# Patient Record
Sex: FEMALE | Race: WHITE | NOT HISPANIC OR LATINO | Employment: FULL TIME | URBAN - METROPOLITAN AREA
[De-identification: names, ages, dates, MRNs, and addresses within clinical notes are randomized per-mention and may not be internally consistent; named-entity substitution may affect disease eponyms.]

---

## 2017-01-25 ENCOUNTER — HOSPITAL ENCOUNTER (OUTPATIENT)
Dept: RADIOLOGY | Facility: HOSPITAL | Age: 47
Discharge: HOME | End: 2017-01-25
Payer: COMMERCIAL

## 2017-01-25 ENCOUNTER — TRANSCRIBE ORDERS (OUTPATIENT)
Dept: ADMINISTRATIVE | Facility: HOSPITAL | Age: 47
End: 2017-01-25

## 2017-01-25 DIAGNOSIS — M54.5 LOW BACK PAIN, UNSPECIFIED BACK PAIN LATERALITY, UNSPECIFIED CHRONICITY, WITH SCIATICA PRESENCE UNSPECIFIED: Primary | ICD-10-CM

## 2017-01-25 DIAGNOSIS — M54.5 LOW BACK PAIN, UNSPECIFIED BACK PAIN LATERALITY, UNSPECIFIED CHRONICITY, WITH SCIATICA PRESENCE UNSPECIFIED: ICD-10-CM

## 2017-01-25 PROCEDURE — 72200 X-RAY EXAM SI JOINTS: CPT

## 2017-03-03 ENCOUNTER — HOSPITAL ENCOUNTER (OUTPATIENT)
Dept: MAMMOGRAPHY | Facility: HOSPITAL | Age: 47
Discharge: HOME | End: 2017-03-03
Payer: COMMERCIAL

## 2017-03-03 DIAGNOSIS — Z12.31 ENCOUNTER FOR SCREENING MAMMOGRAM FOR MALIGNANT NEOPLASM OF BREAST: ICD-10-CM

## 2017-03-03 PROCEDURE — G0202 SCR MAMMO BI INCL CAD: HCPCS

## 2017-04-20 ENCOUNTER — HOSPITAL ENCOUNTER (OUTPATIENT)
Dept: RADIOLOGY | Facility: CLINIC | Age: 47
Discharge: HOME | End: 2017-04-20
Payer: COMMERCIAL

## 2017-04-20 ENCOUNTER — ALLSCRIPTS OFFICE VISIT (OUTPATIENT)
Dept: OTHER | Facility: OTHER | Age: 47
End: 2017-04-20

## 2017-04-20 DIAGNOSIS — M17.11 PRIMARY OSTEOARTHRITIS OF RIGHT KNEE: ICD-10-CM

## 2017-04-20 DIAGNOSIS — M25.561 PAIN IN RIGHT KNEE: ICD-10-CM

## 2017-04-20 DIAGNOSIS — M71.21 SYNOVIAL CYST OF RIGHT POPLITEAL SPACE: ICD-10-CM

## 2017-04-20 PROCEDURE — 73562 X-RAY EXAM OF KNEE 3: CPT

## 2017-04-26 ENCOUNTER — HOSPITAL ENCOUNTER (OUTPATIENT)
Dept: RADIOLOGY | Facility: HOSPITAL | Age: 47
Discharge: HOME | End: 2017-04-26
Attending: ORTHOPAEDIC SURGERY
Payer: COMMERCIAL

## 2017-04-26 DIAGNOSIS — M25.561 PAIN IN RIGHT KNEE: ICD-10-CM

## 2017-04-26 PROCEDURE — 73721 MRI JNT OF LWR EXTRE W/O DYE: CPT

## 2017-04-27 ENCOUNTER — ALLSCRIPTS OFFICE VISIT (OUTPATIENT)
Dept: OTHER | Facility: OTHER | Age: 47
End: 2017-04-27

## 2017-04-28 ENCOUNTER — ALLSCRIPTS OFFICE VISIT (OUTPATIENT)
Dept: OTHER | Facility: OTHER | Age: 47
End: 2017-04-28

## 2017-05-08 ENCOUNTER — APPOINTMENT (OUTPATIENT)
Dept: PHYSICAL THERAPY | Facility: CLINIC | Age: 47
End: 2017-05-08
Payer: COMMERCIAL

## 2017-05-08 DIAGNOSIS — M71.21 SYNOVIAL CYST OF RIGHT POPLITEAL SPACE: ICD-10-CM

## 2017-05-08 DIAGNOSIS — M17.11 PRIMARY OSTEOARTHRITIS OF RIGHT KNEE: ICD-10-CM

## 2017-05-08 PROCEDURE — 97161 PT EVAL LOW COMPLEX 20 MIN: CPT

## 2017-05-08 PROCEDURE — 97110 THERAPEUTIC EXERCISES: CPT

## 2017-05-11 ENCOUNTER — APPOINTMENT (OUTPATIENT)
Dept: PHYSICAL THERAPY | Facility: CLINIC | Age: 47
End: 2017-05-11
Payer: COMMERCIAL

## 2017-05-16 ENCOUNTER — GENERIC CONVERSION - ENCOUNTER (OUTPATIENT)
Dept: OTHER | Facility: OTHER | Age: 47
End: 2017-05-16

## 2017-05-16 ENCOUNTER — APPOINTMENT (OUTPATIENT)
Dept: PHYSICAL THERAPY | Facility: CLINIC | Age: 47
End: 2017-05-16
Payer: COMMERCIAL

## 2017-05-16 PROCEDURE — 97110 THERAPEUTIC EXERCISES: CPT

## 2017-05-18 ENCOUNTER — APPOINTMENT (OUTPATIENT)
Dept: PHYSICAL THERAPY | Facility: CLINIC | Age: 47
End: 2017-05-18
Payer: COMMERCIAL

## 2017-05-18 PROCEDURE — 97110 THERAPEUTIC EXERCISES: CPT

## 2017-05-23 ENCOUNTER — APPOINTMENT (OUTPATIENT)
Dept: PHYSICAL THERAPY | Facility: CLINIC | Age: 47
End: 2017-05-23
Payer: COMMERCIAL

## 2017-05-25 ENCOUNTER — APPOINTMENT (OUTPATIENT)
Dept: PHYSICAL THERAPY | Facility: CLINIC | Age: 47
End: 2017-05-25
Payer: COMMERCIAL

## 2017-05-25 PROCEDURE — 97110 THERAPEUTIC EXERCISES: CPT

## 2017-05-30 ENCOUNTER — APPOINTMENT (OUTPATIENT)
Dept: PHYSICAL THERAPY | Facility: CLINIC | Age: 47
End: 2017-05-30
Payer: COMMERCIAL

## 2017-05-30 PROCEDURE — 97110 THERAPEUTIC EXERCISES: CPT

## 2017-06-12 ENCOUNTER — ALLSCRIPTS OFFICE VISIT (OUTPATIENT)
Dept: OTHER | Facility: OTHER | Age: 47
End: 2017-06-12

## 2017-12-04 ENCOUNTER — GENERIC CONVERSION - ENCOUNTER (OUTPATIENT)
Dept: OTHER | Facility: OTHER | Age: 47
End: 2017-12-04

## 2017-12-12 ENCOUNTER — GENERIC CONVERSION - ENCOUNTER (OUTPATIENT)
Dept: OTHER | Facility: OTHER | Age: 47
End: 2017-12-12

## 2017-12-22 ENCOUNTER — ALLSCRIPTS OFFICE VISIT (OUTPATIENT)
Dept: OTHER | Facility: OTHER | Age: 47
End: 2017-12-22

## 2017-12-23 NOTE — PROGRESS NOTES
Assessment   1  Malabsorption syndrome (579 9) (K90 9)   2  Overweight (278 02) (E66 3)   3  Status post gastric bypass for obesity (V45 86) (Z98 84)    Discussion/Summary      Follow up in 1 year   Follow diet as discussed  Recommend increase vitamin D to 2000 IU per day  Follow vitamin/mineral recommendations as reviewed with you  Exercise as tolerated  our office if you have any problems with abdominal pain especially if associated with fever, chills, nausea, vomiting, or any other concerns  lap Miles-en-y gastric bypass surgery/ Overweight/bmi 292   is 56year old female   status post laparoscopic Miles-en-Y gastric bypass surgery by Dr Baltazar Salinas is here for routine follow-up  Has no complaints today tolerating a regular diet eating at least 60 grams of protein per day 30/60 minute rule with liquids at least 64 ounces of fluid per day not exercising regularly-advised of importance of same for long-term weight management has regained a net 5 lb from last year but has still maintained a 79% excess body weight loss which is above average for this time frame  Malabsorption- pateint is at risk for malabsorption of vitamins/minerals secondary to malabsorption from her procedure and restriction of intakes current supplements and advised on same is currently taking one regular MVI, additional vitamin B12, 1000 IU vitamin D and 2-? 500 mg calcium per day, an extra iron and extra vitamin C her December labs with her and advised on supplements B12 is very high-advised she can hold extra vitamin B12 vitamin D is lower normal and recommended increase to 2000 IU vitamin D3 daily  The patient has the current Goals: Maintain +/-Continued weight loss with good nutrition intakes vitamin/mineral levels as tolerated      The patent has the current Barriers: None identified      Patient is able to Self-Care  Educational resources provided: N/a      Possible side effects of new medications were reviewed with the patient/guardian today  The treatment plan was reviewed with the patient/guardian  The patient/guardian understands and agrees with the treatment plan    She was advised to follow up due to malabsorption risks  Chief Complaint   Patient in office today for annual visit  She is taking vitamins  Post-Op   Post-Op Bariatric Surgery:      Harsha Dial is status post laparoscopic Miles-en-Y procedure,-- performed on 12/17/2013--   by Dr Higgins  HPI:    Diet and Exercise:    PE:    Assessment:    Plan:    Instructions / Recommendations:      Review of Systems        Constitutional: weight is up 5 lb from last year, but-- not feeling poorly  Cardiovascular: no chest pain-- and-- no palpitations  Respiratory: no shortness of breath-- and-- no wheezing  Gastrointestinal: no abdominal pain,-- no nausea,-- no vomiting,-- no constipation-- and-- no diarrhea  Psychiatric: no anxiety-- and-- no depression  Active Problems   1  Baker's cyst of knee, right (727 51) (M71 21)   2  Condyloma acuminata (078 11) (A63 0)   3  Diarrhea (787 91) (R19 7)   4  Encounter for gynecological examination without abnormal finding (V72 31) (Z01 419)   5  Encounter for routine gynecological examination (V72 31) (Z01 419)   6  Encounter for screening mammogram for malignant neoplasm of breast (V76 12)     (Z12 31)   7  Headache (784 0) (R51)   8  Heavy periods (626 2) (N92 0)   9  History of long-term treatment with high-risk medication (V58 69) (Z92 29)   10  Knee pain, right (719 46) (M25 561)   11  Lightheadedness (780 4) (R42)   12  Malabsorption syndrome (579 9) (K90 9)   13  Meningioma (225 2) (D32 9)   14  Nausea (787 02) (R11 0)   15  Obesity surgery status (V45 86) (Z98 84)   16  Osteoarthritis of right knee (715 96) (M17 11)   17  Postgastrectomy malabsorption (579 3) (K91 2,Z90 3)   18  Pre-operative cardiovascular examination (V72 81) (Z01 810)   19  Right knee pain (719 46) (M25 561)   20  Sacroiliitis (720 2) (M46 1)   21  Screening for HPV (human papillomavirus) (V73 81) (Z11 51)   22  Status post gastric bypass for obesity (V45 86) (Z98 84)    Social History    · Being A Social Drinker   · Daily Coffee Consumption (___ Cups/Day)   · Daily Cola Consumption (___ Cans/Day)   · Denied: History of Drug Use   · Never A Smoker  The social history was reviewed and updated today  Current Meds    1  Calcium Citrate + D 315-250 MG-UNIT Oral Tablet; Therapy: (Recorded:20Jan2014) to Recorded   2  Iron 325 (65 Fe) MG Oral Tablet Recorded   3  Multi-Vitamin TABS; TAKE 1 TABLET DAILY; Therapy: (Recorded:29Ajb5759) to Recorded   4  Vitamin B12 TABS; Therapy: (Recorded:20Apr2017) to Recorded   5  Vitamin C TABS; Therapy: (Recorded:20Apr2017) to Recorded   6  Vitamin D3 CAPS; Therapy: (Recorded:30Lzq6645) to Recorded     The medication list was reviewed and updated today  Allergies   1  No Known Drug Allergies    Vitals    Recorded: 22Dec2017 01:33PM   Temperature 98 2 F   Heart Rate 70   Respiration 18   Systolic 521   Diastolic 64   Height 5 ft 7 5 in   Weight 182 lb 8 0 oz   BMI Calculated 28 16   BSA Calculated 1 96     Physical Exam        Constitutional      General appearance: No acute distress, well appearing and well nourished  Eyes bilateral conjunctiva without pallor  Ears, Nose, Mouth, and Throat mucous membranes moist       Pulmonary      Respiratory effort: No increased work of breathing or signs of respiratory distress  Auscultation of lungs: Clear to auscultation  Cardiovascular      Auscultation of heart: Normal rate and rhythm, normal S1 and S2, without murmurs  Abdomen soft, no incisional hernias appreciated        Musculoskeletal      Gait and station: Normal        Psychiatric      Orientation to person, place, and time: Normal        Mood and affect: Normal           Results/Data   December 2017 labs reviewed with her         Future Appointments      Date/Time Provider Specialty Site   12/26/2018 10:30 AM Chito Mulligan, [facility] General [facility]     Signatures    Electronically signed by : Alicia Gonzalez [facility]; Dec 22 2017  2:23PM EST                       (Author)     Electronically signed by : ALDAIR Bryant ; Dec 22 2017  2:26PM EST                       (Validation)

## 2018-01-12 VITALS
DIASTOLIC BLOOD PRESSURE: 70 MMHG | BODY MASS INDEX: 28.41 KG/M2 | SYSTOLIC BLOOD PRESSURE: 116 MMHG | WEIGHT: 181 LBS | HEIGHT: 67 IN

## 2018-01-13 VITALS
HEIGHT: 67 IN | SYSTOLIC BLOOD PRESSURE: 100 MMHG | WEIGHT: 179 LBS | DIASTOLIC BLOOD PRESSURE: 64 MMHG | BODY MASS INDEX: 28.09 KG/M2

## 2018-01-13 VITALS
SYSTOLIC BLOOD PRESSURE: 104 MMHG | DIASTOLIC BLOOD PRESSURE: 72 MMHG | WEIGHT: 181 LBS | HEART RATE: 76 BPM | BODY MASS INDEX: 28.35 KG/M2

## 2018-01-13 NOTE — RESULT NOTES
Results  (1) COMPREHENSIVE METABOLIC PANEL [id number] 47:03WK Shayla Saez     Test Name Result Flag Reference   Glucose, Serum 86 mg/dL  65-99   BUN 16 mg/dL  6-24   Creatinine, Serum 0 73 mg/dL  0 57-1 00   eGFR If NonAfricn Am 100 mL/min/1 73  >59   eGFR If Africn Am 115 mL/min/1 73  >59   BUN/Creatinine Ratio 22  9-23   Sodium, Serum 138 mmol/L  136-144   **Effective December 12, 2016 the reference interval**                   for Sodium, Serum will be changing to:                                                             134 - 144   Potassium, Serum 4 9 mmol/L  3 5-5 2   Chloride, Serum 100 mmol/L     **Effective December 12, 2016 the reference interval**                   for Chloride, Serum will be changing to:                                                              96 - 106   Carbon Dioxide, Total 24 mmol/L  18-29   Calcium, Serum 8 7 mg/dL  8 7-10 2   Protein, Total, Serum 6 6 g/dL  6 0-8 5   Albumin, Serum 4 4 g/dL  3 5-5 5   Globulin, Total 2 2 g/dL  1 5-4 5   A/G Ratio 2 0  1 1-2 5   Bilirubin, Total 1 2 mg/dL  0 0-1 2   Alkaline Phosphatase, S 90 IU/L     AST (SGOT) 19 IU/L  0-40   ALT (SGPT) 12 IU/L  0-32     (1) CBC/ PLT (NO DIFF) [id number] 12:07PM Christian Mulligan     Test Name Result Flag Reference   WBC 5 1 x10E3/uL  3 4-10 8   RBC 4 06 x10E6/uL  3 77-5 28   Hemoglobin 12 9 g/dL  11 1-15 9   Hematocrit 40 0 %  34 0-46  6   MCV 99 fL H 79-97   MCH 31 8 pg  26 6-33 0   MCHC 32 3 g/dL  31 5-35 7   RDW 13 8 %  12 3-15 4   Platelets 856 [id number]/NF  150-379     (1) LIPID PANEL, FASTING [id number] 12:07PM Christian Mulligan     Test Name Result Flag Reference   Cholesterol, Total 147 mg/dL  100-199   Triglycerides 44 mg/dL  0-149   HDL Cholesterol 72 mg/dL  >39   VLDL Cholesterol Galo 9 mg/dL  5-40   LDL Cholesterol Calc 66 mg/dL  0-99   T  Chol/HDL Ratio 2 0 ratio units  0 0-4 4   T   Chol/HDL Ratio                                                             Men  Women 1/2 Avg  Risk  3 4    3 3                                                   Avg Risk  5 0    4 4                                                2X Avg  Risk  9 6    7 1                                                3X Avg  Risk 23 4   11 0     (1) IRON SATURATION %, TIBC [id number] 12:07PM RUST Goodwin     Test Name Result Flag Reference   Iron Bind  Cap (TIBC) 397 ug/dL  250-450   UIBC 248 ug/dL  131-425   Iron, Serum 149 ug/dL     Iron Saturation 38 %  15-55     (LC) Vitamin B12 and Folate [id number] 12:07PM Daphne Lockr     Test Name Result Flag Reference   Vitamin B12 686 pg/mL  202-429   Folate (Folic Acid), Serum 36 3 ng/mL  >3 0   A serum folate concentration of less than 3 1 ng/mL is  considered to represent clinical deficiency  (1) VITAMIN D 25-HYDROXY [id number] 12:07PM [facility]     Test Name Result Flag Reference   Vitamin D, 25-Hydroxy 32 4 ng/mL  30 0-100 0   Vitamin D deficiency has been defined by the [facility] [address] practice guideline as a  level of serum 25-OH vitamin D less than 20 ng/mL (1,2)  The Endocrine Society went on to further define vitamin D  insufficiency as a level between 21 and 29 ng/mL (2)  1  IOM ([place] of Medicine)  2010  Dietary reference     intakes for calcium and D  [address] [facility]: The     World Vital Records  2  Stephany MF, Howie NC, Ciro COOK, et al      Evaluation, treatment, and prevention of vitamin D     deficiency: an Endocrine Society clinical practice     guideline  JCEM  2011 Jul; 96(7):1911-30       (1) FERRITIN [id number] 12:07PM Daphne Boomrkward     Test Name Result Flag Reference   Ferritin, Serum 20 ng/mL       (1) PTH N-TERMINAL (INTACT) [id number] 12:07PM Daphne Awkward     Test Name Result Flag Reference   PTH, Intact 30 pg/mL  15-65     [facility]) Kody Almazan ZOV89 Default [id number] 12:07PM Daphne Awkward     Test Name Result Flag Reference   [place] Justin CMP14 Default Comment     A hand-written panel/profile was received from your office  In  accordance with the [facility] Ambiguous Test Code Policy dated July 2139, we have completed your order by using the closest currently  or formerly recognized AMA panel  We have assigned Comprehensive  Metabolic Panel (14), Test Code #846910 to this request   If this  is not the testing you wished to receive on this specimen, please  contact the [address] [place] [address] Client Inquiry/Technical Services Department  to clarify the test order  We appreciate your business  (1) VITAMIN A [id number] 12:07PM Ubaldo Mulligan     Test Name Result Flag Reference   Vitamin A, Serum 42 ug/dL  20-65     (1) VITAMIN B1, WHOLE BLOOD [id number] 12:07PM Kevin Yan     Test Name Result Flag Reference   Vit  B1, Whole Blood 133 3 nmol/L  66 5-200 0       Discussion/Summary  12/2/2016 labs reviewed  Your iron stores-ferritin is normal at 20 but is decreasing from 47 on prior labs  If you have regular menstrual periods you should be taking an Additional 18-27 mg of over-the-counter iron daily to help maintain your levels  Please keep your routine office visit  If you do not have a scheduled routine appointment, please call the office to schedule it  Our office number is [phone]  If you have questions about your results, this will be discussed with you at your upcoming  visit  If you have gotten your most recent labs after your recent visit and have questions, please call the office  I look forward to seeing you at your next visit        Signatures   Electronically signed by : Nazanin Daniels, [facility]; Dec 22 2016  5:54PM EST                       (Author)

## 2018-01-15 NOTE — RESULT NOTES
Message   mail if patient does not come for 12/23/16 ov  CR     Verified Results  (1) COMPREHENSIVE METABOLIC PANEL [id number] 70:61HL Krupa Dejesus     Test Name Result Flag Reference   Glucose, Serum 86 mg/dL  65-99   BUN 16 mg/dL  6-24   Creatinine, Serum 0 73 mg/dL  0 57-1 00   eGFR If NonAfricn Am 100 mL/min/1 73  >59   eGFR If Africn Am 115 mL/min/1 73  >59   BUN/Creatinine Ratio 22  9-23   Sodium, Serum 138 mmol/L  136-144   **Effective December 12, 2016 the reference interval**                   for Sodium, Serum will be changing to:                                                             134 - 144   Potassium, Serum 4 9 mmol/L  3 5-5 2   Chloride, Serum 100 mmol/L     **Effective December 12, 2016 the reference interval**                   for Chloride, Serum will be changing to:                                                              96 - 106   Carbon Dioxide, Total 24 mmol/L  18-29   Calcium, Serum 8 7 mg/dL  8 7-10 2   Protein, Total, Serum 6 6 g/dL  6 0-8 5   Albumin, Serum 4 4 g/dL  3 5-5 5   Globulin, Total 2 2 g/dL  1 5-4 5   A/G Ratio 2 0  1 1-2 5   Bilirubin, Total 1 2 mg/dL  0 0-1 2   Alkaline Phosphatase, S 90 IU/L     AST (SGOT) 19 IU/L  0-40   ALT (SGPT) 12 IU/L  0-32     (1) CBC/ PLT (NO DIFF) [id number] 12:07PM [employer] [employer], Jacklyn Cough     Test Name Result Flag Reference   WBC 5 1 x10E3/uL  3 4-10 8   RBC 4 06 x10E6/uL  3 77-5 28   Hemoglobin 12 9 g/dL  11 1-15 9   Hematocrit 40 0 %  34 0-46  6   MCV 99 fL H 79-97   MCH 31 8 pg  26 6-33 0   MCHC 32 3 g/dL  31 5-35 7   RDW 13 8 %  12 3-15 4   Platelets 197 J25Z4/GW  150-379     (1) LIPID PANEL, FASTING [id number] 12:07PM [employer] [employer], Jacklyn Cough     Test Name Result Flag Reference   Cholesterol, Total 147 mg/dL  100-199   Triglycerides 44 mg/dL  0-149   HDL Cholesterol 72 mg/dL  >39   VLDL Cholesterol Galo 9 mg/dL  5-40   LDL Cholesterol Calc 66 mg/dL  0-99   T  Chol/HDL Ratio 2 0 ratio units  0 0-4 4   T   Chol/HDL Ratio 12: 07PM Hayden Sommer     Test Name Result Flag Reference   [place] CMP14 Default Comment     A hand-written panel/profile was received from your office  In  accordance with the [facility] Ambiguous Test Code Policy dated July 8321, we have completed your order by using the closest currently  or formerly recognized AMA panel  We have assigned Comprehensive  Metabolic Panel (14), Test Code #712151 to this request   If this  is not the testing you wished to receive on this specimen, please  contact the [address] [place] [address] Client Inquiry/Technical Services Department  to clarify the test order  We appreciate your business  (1) VITAMIN A [id number] 12:07PM Isaak Martinezemily Portillo     Test Name Result Flag Reference   Vitamin A, Serum 42 ug/dL  20-65     (1) VITAMIN B1, WHOLE BLOOD [id number] 12:07PM Hayden Sommer     Test Name Result Flag Reference   Vit  B1, Whole Blood 133 3 nmol/L  66 5-200 0       Discussion/Summary  12/2/2016 labs reviewed  Your iron stores-ferritin is normal at 20 but is decreasing from 47 on prior labs  If you have regular menstrual periods you should be taking an Additional 18-27 mg of over-the-counter iron daily to help maintain your levels  Please keep your routine office visit  If you do not have a scheduled routine appointment, please call the office to schedule it  Our office number is [phone]  If you have questions about your results, this will be discussed with you at your upcoming  visit  If you have gotten your most recent labs after your recent visit and have questions, please call the office  I look forward to seeing you at your next visit        Signatures   Electronically signed by : Mikhail Graff, [facility]; Dec 22 2016  5:54PM EST                       (Author)

## 2018-01-22 VITALS
BODY MASS INDEX: 27.66 KG/M2 | DIASTOLIC BLOOD PRESSURE: 64 MMHG | RESPIRATION RATE: 18 BRPM | SYSTOLIC BLOOD PRESSURE: 104 MMHG | TEMPERATURE: 98.2 F | HEIGHT: 68 IN | HEART RATE: 70 BPM | WEIGHT: 182.5 LBS

## 2018-01-23 NOTE — RESULT NOTES
Discussion/Summary   December 2017 labs reviewed  One of your liver enzymes -total bilirubin is just slightly high at 1 3-this is not overly concerning at this time  You can also review this with your PCP at a routine visit  Your vitamin b12 level is VERY high at >2000  this MAY be ok, but if you are taking EXTRA vitamin B12 please stop this now  Please keep your routine office visit  If you do not have a scheduled routine appointment, please call the office to schedule it  Our office number is [phone]  If you have questions about your results, this will be discussed with you at your upcoming  visit  If you have gotten your most recent labs after your recent visit and have questions, please call the office  I look forward to seeing you at your next visit  Verified Results  (1) CBC/ PLT (NO DIFF) [id number] 07:40AM Hugo Mulligan     Test Name Result Flag Reference   WBC 5 6 x10E3/uL  3 4-10 8   RBC 4 30 x10E6/uL  3 77-5 28   Hemoglobin 14 1 g/dL  11 1-15 9   **Please note reference interval change**   Hematocrit 42 6 %  34 0-46  6   MCV 99 fL H 79-97   MCH 32 8 pg  26 6-33 0   MCHC 33 1 g/dL  31 5-35 7   RDW 13 1 %  12 3-15 4   Platelets 657 [id number]/WP  150-379     (1) COMPREHENSIVE METABOLIC PANEL [id number] 51:61HF Isaak Janenolan Harvey     Test Name Result Flag Reference   Glucose, Serum 95 mg/dL  65-99   BUN 13 mg/dL  6-24   Creatinine, Serum 0 74 mg/dL  0 57-1 00   BUN/Creatinine Ratio 18  9-23   Sodium, Serum 140 mmol/L  134-144   Potassium, Serum 4 0 mmol/L  3 5-5 2   Chloride, Serum 100 mmol/L     Carbon Dioxide, Total 24 mmol/L  18-29   Calcium, Serum 9 3 mg/dL  8 7-10 2   Protein, Total, Serum 6 9 g/dL  6 0-8 5   Albumin, Serum 4 6 g/dL  3 5-5 5   Globulin, Total 2 3 g/dL  1 5-4 5   A/G Ratio 2 0  1 2-2 2   Bilirubin, Total 1 3 mg/dL H 0 0-1 2   Alkaline Phosphatase, S 87 IU/L     AST (SGOT) 25 IU/L  0-40   ALT (SGPT) 17 IU/L  0-32   eGFR If NonAfricn Am 97 mL/min/1 73  >59   eGFR If Africn Am 112 mL/min/1 73  >59     (1) FERRITIN [id number] 07:40AM Edtih Mulligan     Test Name Result Flag Reference   Ferritin, Serum 41 ng/mL       (1) FOLATE [id number] 07:40AM Edith Mulligan     Test Name Result Flag Reference   Folate (Folic Acid), Serum >13 2 ng/mL  >3 0   A serum folate concentration of less than 3 1 ng/mL is  considered to represent clinical deficiency  (1) PTH N-TERMINAL (INTACT) [id number] 07:40AM Tiara Epps     Test Name Result Flag Reference   PTH, Intact 28 pg/mL  15-65     (1) VITAMIN A [id number] 07:40AM Edith Mulligan     Test Name Result Flag Reference   Vitamin A, Serum 50 ug/dL  20-65     (1) VITAMIN B1, WHOLE BLOOD [id number] 07:40AM Edith Mulligan     Test Name Result Flag Reference   Vit  B1, Whole Blood 127 6 nmol/L  66 5-200 0   This test was developed and its performance characteristics  determined by [facility]  It has not been cleared or approved  by the Food and Drug Administration  (1) VITAMIN B12 [id number] 07:40AM Edith Mulligan     Test Name Result Flag Reference   Vitamin B12 >2000 pg/mL H 232-1245   **Please note reference interval change**     (1) VITAMIN D 25-HYDROXY [id number] 07:40AM Edith Mulligan     Test Name Result Flag Reference   Vitamin D, 25-Hydroxy 31 9 ng/mL  30 0-100 0   Vitamin D deficiency has been defined by the [place] of  Medicine and an Endocrine Society practice guideline as a  level of serum 25-OH vitamin D less than 20 ng/mL (1,2)  The Endocrine Society went on to further define vitamin D  insufficiency as a level between 21 and 29 ng/mL (2)  1  IOM ([place] of Medicine)  2010  Dietary reference     intakes for calcium and D  [address] [facility]: The     [place]  2  Stephany MF, Howie NC, Ciro COOK, et al      Evaluation, treatment, and prevention of vitamin D     deficiency: an Endocrine Society clinical practice     guideline  JCEM  2011 Jul; 96(7):1911-30  [facility] Room MYP71 Default [id number] 07:40AM Dot Yoder     Test Name Result Flag Reference   Pushpa Julian CMP14 Default (Report)     A hand-written panel/profile was received from your office  In  accordance with the [facility] Ambiguous Test Code Policy dated July 9462, we have completed your order by using the closest currently  or formerly recognized AMA panel  We have assigned Comprehensive  Metabolic Panel (14), Test Code #335792 to this request  If this  is not the testing you wished to receive on this specimen, please  contact the [address] [place] [address] Client Inquiry/Technical Services Department  to clarify the test order  We appreciate your business  A hand-written panel/profile was received from your office  In  accordance with the [facility] Ambiguous Test Code Policy dated July 5158, we have completed your order by using the closest currently  or formerly recognized AMA panel  We have assigned Comprehensive  Metabolic Panel (14), Test Code #[id number] to this request   If this  is not the testing you wished to receive on this specimen, please  contact the [address] [place] [address] Client Inquiry/Technical Services Department  to clarify the test order  We appreciate your business

## 2018-03-03 DIAGNOSIS — Z12.31 ENCOUNTER FOR SCREENING MAMMOGRAM FOR MALIGNANT NEOPLASM OF BREAST: ICD-10-CM

## 2018-03-19 ENCOUNTER — OFFICE VISIT (OUTPATIENT)
Dept: OBGYN CLINIC | Facility: MEDICAL CENTER | Age: 48
End: 2018-03-19
Payer: COMMERCIAL

## 2018-03-19 ENCOUNTER — TELEPHONE (OUTPATIENT)
Dept: OBGYN CLINIC | Facility: MEDICAL CENTER | Age: 48
End: 2018-03-19

## 2018-03-19 VITALS — SYSTOLIC BLOOD PRESSURE: 114 MMHG | WEIGHT: 190 LBS | BODY MASS INDEX: 29.32 KG/M2 | DIASTOLIC BLOOD PRESSURE: 72 MMHG

## 2018-03-19 DIAGNOSIS — N93.9 ABNORMAL UTERINE BLEEDING (AUB): Primary | ICD-10-CM

## 2018-03-19 PROBLEM — E66.3 OVERWEIGHT: Status: ACTIVE | Noted: 2017-12-22

## 2018-03-19 PROBLEM — M25.561 KNEE PAIN, RIGHT: Status: ACTIVE | Noted: 2017-04-19

## 2018-03-19 PROBLEM — M71.21 BAKER'S CYST OF KNEE, RIGHT: Status: ACTIVE | Noted: 2017-04-28

## 2018-03-19 PROBLEM — M17.11 OSTEOARTHRITIS OF RIGHT KNEE: Status: ACTIVE | Noted: 2017-04-28

## 2018-03-19 PROBLEM — M25.561 RIGHT KNEE PAIN: Status: ACTIVE | Noted: 2017-04-20

## 2018-03-19 PROCEDURE — 99213 OFFICE O/P EST LOW 20 MIN: CPT | Performed by: NURSE PRACTITIONER

## 2018-03-19 RX ORDER — MULTIVITAMIN
1 TABLET ORAL DAILY
COMMUNITY

## 2018-03-19 RX ORDER — BIOTIN 1 MG
TABLET ORAL
COMMUNITY

## 2018-03-19 RX ORDER — CALCIUM CARBONATE 750 MG/1
TABLET, CHEWABLE ORAL
COMMUNITY
End: 2018-03-19 | Stop reason: ALTCHOICE

## 2018-03-19 RX ORDER — NORTRIPTYLINE HYDROCHLORIDE 25 MG/1
CAPSULE ORAL
COMMUNITY
Start: 2015-09-11 | End: 2018-03-19 | Stop reason: ALTCHOICE

## 2018-03-19 RX ORDER — NICOTINE POLACRILEX 2 MG
1000 GUM BUCCAL
COMMUNITY
End: 2018-05-01 | Stop reason: ALTCHOICE

## 2018-03-19 RX ORDER — UBIDECARENONE 75 MG
CAPSULE ORAL
COMMUNITY
End: 2018-05-01 | Stop reason: ALTCHOICE

## 2018-03-19 RX ORDER — METOCLOPRAMIDE 10 MG/1
10 TABLET ORAL
COMMUNITY
End: 2018-05-01 | Stop reason: ALTCHOICE

## 2018-03-19 RX ORDER — TOPIRAMATE 25 MG/1
25 TABLET ORAL
COMMUNITY
End: 2018-05-01 | Stop reason: ALTCHOICE

## 2018-03-19 RX ORDER — ASCORBATE CALCIUM 500 MG
500 TABLET ORAL
COMMUNITY

## 2018-03-19 NOTE — PROGRESS NOTES
[facility]  Lupe Esposito 56 y o  SUBJECTIVE    HPI: Lupe Esposito is a 56 y o  M2W2250 female presenting today for acute office visit  She reports prolonged menstrual bleeding - she is still menstruating and period started on 3/4/18  In the beginning the bleeding was heavy with clots, now flow is steadily tapering off  Menses are normally heavy for the first 3d and last 7d total  This is the first time that she has ever had a long period like this  Denies hot flushes or night sweats  No changes in medical history aside from lupus diagnosis 2y ago  Sexually active with one male partner,    had vasectomy  Denies concern for STIs  Has noted that sex feels "tighter" over the past 6mos  History of LEEP for abnormal pap smear  Of note, Fred Bergman found out in the last 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        The following portions of the patient's history were reviewed and updated as appropriate: allergies, current medications, past family history, past medical history and problem list         ROS  General: negative for chills or fever   Respiratory: no cough, shortness of breath, or wheezing  Cardiovascular: no chest pain or dyspnea on exertion  Gastrointestinal: no abdominal pain, change in bowel habits, or black or bloody stools  Urinary: no urinary symptoms  Genitourinary: As Noted in HPI  Neurological: negative      OBJECTIVE  Vitals:    03/19/18 1538   BP: 114/72   BP Location: Left arm   Patient Position: Sitting   Weight: 86 2 kg (190 lb)       General appearance: alert and oriented, in no acute distress  Head: Normocephalic, without obvious abnormality, atraumatic  Neurologic: Grossly normal   External genitalia: normal general appearance  Vaginal: menstrual blood in cavity  Cervix: difficult to fully visualize due to presence of menstrual blood  Adnexa: normal bimanual exam  Uterus: normal single, nontender          ASSESSMENT  Abnormal Uterine Bleeding          PLAN  1  Likely perimenopausal AUB and/or stress-related  2  TSH, CBC  3  Pelvic US to investigate structural causes for AUB  4  If pathology can be ruled out will discuss medical and/or surgical interventions for AUB        All questions were answered & Evelyn Zimmerman expressed understanding      Hunter Hammond

## 2018-03-19 NOTE — TELEPHONE ENCOUNTER
Spoke with pt - she has been having heavy, bright red bleeding for 15 days  Passing clot  Goes through 7-8 heavy duty pads a day and has cramps  Sexually active  Last seen 04/2017 by Radha More  Patient requested an appointment  Made on for today in the [facility] office with [facility]

## 2018-03-22 LAB
BASOPHILS # BLD AUTO: 0 X10E3/UL (ref 0–0.2)
BASOPHILS NFR BLD AUTO: 0 %
EOSINOPHIL # BLD AUTO: 0.2 X10E3/UL (ref 0–0.4)
EOSINOPHIL NFR BLD AUTO: 3 %
ERYTHROCYTE [DISTWIDTH] IN BLOOD BY AUTOMATED COUNT: 13.4 % (ref 12.3–15.4)
HCT VFR BLD AUTO: 37.7 % (ref 34–46.6)
HGB BLD-MCNC: 12.7 G/DL (ref 11.1–15.9)
IMM GRANULOCYTES # BLD: 0 X10E3/UL (ref 0–0.1)
IMM GRANULOCYTES NFR BLD: 0 %
LYMPHOCYTES # BLD AUTO: 2 X10E3/UL (ref 0.7–3.1)
LYMPHOCYTES NFR BLD AUTO: 38 %
MCH RBC QN AUTO: 32.6 PG (ref 26.6–33)
MCHC RBC AUTO-ENTMCNC: 33.7 G/DL (ref 31.5–35.7)
MCV RBC AUTO: 97 FL (ref 79–97)
MONOCYTES # BLD AUTO: 0.4 X10E3/UL (ref 0.1–0.9)
MONOCYTES NFR BLD AUTO: 8 %
NEUTROPHILS # BLD AUTO: 2.6 X10E3/UL (ref 1.4–7)
NEUTROPHILS NFR BLD AUTO: 51 %
PLATELET # BLD AUTO: 255 X10E3/UL (ref 150–379)
RBC # BLD AUTO: 3.89 X10E6/UL (ref 3.77–5.28)
TSH SERPL DL<=0.005 MIU/L-ACNC: 1.88 UIU/ML (ref 0.45–4.5)
WBC # BLD AUTO: 5.2 X10E3/UL (ref 3.4–10.8)

## 2018-03-22 NOTE — PROGRESS NOTES
CBC and TSH are normal  Please let patient know and also encourage her to have pelvic US done  Thanks!

## 2018-03-23 ENCOUNTER — HOSPITAL ENCOUNTER (OUTPATIENT)
Dept: ULTRASOUND IMAGING | Facility: HOSPITAL | Age: 48
Discharge: HOME | End: 2018-03-23
Payer: COMMERCIAL

## 2018-03-23 DIAGNOSIS — N93.9 ABNORMAL UTERINE BLEEDING (AUB): ICD-10-CM

## 2018-03-23 PROCEDURE — 76830 TRANSVAGINAL US NON-OB: CPT

## 2018-03-23 PROCEDURE — 76856 US EXAM PELVIC COMPLETE: CPT

## 2018-03-23 NOTE — PROGRESS NOTES
Called and LM on VM that CBC and TSH results were normal   Encouraged pt to schedule pelvic u/s if she hasn't already done so and we will call her with results  Advised pt to call back with any questions/concerns

## 2018-03-30 ENCOUNTER — TELEPHONE (OUTPATIENT)
Dept: OBGYN CLINIC | Facility: CLINIC | Age: 48
End: 2018-03-30

## 2018-03-30 ENCOUNTER — HOSPITAL ENCOUNTER (OUTPATIENT)
Dept: RADIOLOGY | Facility: HOSPITAL | Age: 48
Discharge: HOME | End: 2018-03-30
Payer: COMMERCIAL

## 2018-03-30 ENCOUNTER — TRANSCRIBE ORDERS (OUTPATIENT)
Dept: ADMINISTRATIVE | Facility: HOSPITAL | Age: 48
End: 2018-03-30

## 2018-03-30 DIAGNOSIS — Z12.31 ENCOUNTER FOR SCREENING MAMMOGRAM FOR MALIGNANT NEOPLASM OF BREAST: ICD-10-CM

## 2018-03-30 PROCEDURE — 77067 SCR MAMMO BI INCL CAD: CPT

## 2018-03-30 NOTE — TELEPHONE ENCOUNTER
----- Message from Jhonathan Maldonado, [address] sent at 3/30/2018 12:39 PM EDT -----  Please notify patient that her pelvic US results are essentially WNL  I reviewed Dianne's note and the patient was evaluated for longer than usual period   Endo stripe measured 8mm   CBC and TSH were WNL    Please advise continuing to observe for now  Change in menstrual features is likely 2ndary to benign perimenopausal hormonal fluctuations  Please do recommend low threshold for reporting ongoing sx of DUB, as Thomas Steen may advised returning to the office for EMB

## 2018-05-01 ENCOUNTER — ANNUAL EXAM (OUTPATIENT)
Dept: OBGYN CLINIC | Facility: MEDICAL CENTER | Age: 48
End: 2018-05-01
Payer: COMMERCIAL

## 2018-05-01 VITALS
WEIGHT: 184 LBS | SYSTOLIC BLOOD PRESSURE: 122 MMHG | BODY MASS INDEX: 28.88 KG/M2 | DIASTOLIC BLOOD PRESSURE: 78 MMHG | HEIGHT: 67 IN

## 2018-05-01 DIAGNOSIS — R92.2 DENSE BREAST TISSUE: ICD-10-CM

## 2018-05-01 DIAGNOSIS — Z12.31 SCREENING MAMMOGRAM, ENCOUNTER FOR: ICD-10-CM

## 2018-05-01 DIAGNOSIS — Z01.419 ENCOUNTER FOR GYNECOLOGICAL EXAMINATION WITHOUT ABNORMAL FINDING: Primary | ICD-10-CM

## 2018-05-01 PROCEDURE — S0612 ANNUAL GYNECOLOGICAL EXAMINA: HCPCS | Performed by: NURSE PRACTITIONER

## 2018-05-01 NOTE — PROGRESS NOTES
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  Patient's last menstrual period was 2018  Sexually active with 1 Male partner, monogamous   had a vasectomy  STI testing not indicated  Denies sexual concerns  Has no breast, bowel, bladder, or vaginal concerns  History of gastric bypass  Gynecologic History   Last pap smear: Date: 2016 and Result: NILM, HPV(-)  History of abnormal pap smears: Yes, had a LEEP in   Last mammogram: 3/30/2018, BI RADS - 1  History of abnormal mammogram: Denies  Contraceptive method: vasectomy  Dyspareunia: denies      Obstetric History        Health Maintenance  Self-breast exams: yes  Metabolic screening: up-to-date  Tobacco cessation: N/A    The following portions of the patient's history were reviewed and updated as appropriate: allergies, current medications, past family history, past medical history, past social history, past surgical history and problem list     At todays visit I discussed the importance of self-breast exams, exercise and healthy diet  Reviewed ASCCP guidelines for cervical cancer screening  Safe sex practices were strongly encouraged to protect against sexually transmitted infections  We reviewed her reproductive life plan  Anticipatory guidance for perimenopause and menopause was reviewed  All questions were answered to her apparent satisfaction       ROS  General ROS: negative for chills or fever  Breast ROS: negative for breast lumps  Respiratory ROS: no cough, shortness of breath, or wheezing  Cardiovascular ROS: no chest pain or dyspnea on exertion  Gastrointestinal ROS: no abdominal pain, change in bowel habits, or black or bloody stools  Genito-Urinary ROS: no dysuria, trouble voiding, or hematuria  Neurological ROS: negative    OBJECTIVE  Vitals:    05/01/18 1000 05/01/18 1013   BP:  122/78   BP Location:  Left arm   Patient Position:  Sitting   Weight: 83 5 kg (184 lb) 83 5 kg (184 lb)   Height:  5' 7" (1 702 m)       Physical Exam   Constitutional: She is oriented to person, place, and time  Vital signs are normal  She appears well-developed and well-nourished  Genitourinary: Vagina normal and uterus normal  Pelvic exam was performed with patient supine  There is no rash, tenderness or lesion on the right labia  There is no rash, tenderness or lesion on the left labia  No erythema in the vagina  No vaginal discharge found  Right adnexum does not display mass and does not display tenderness  Left adnexum does not display mass and does not display tenderness  Cervix does not exhibit motion tenderness  Uterus is not tender  Genitourinary Comments: LEEP cervix (cervical crater still present)   HENT:   Head: Normocephalic and atraumatic  Neck: No thyromegaly present  Cardiovascular: Normal heart sounds  Pulmonary/Chest: Effort normal and breath sounds normal  Right breast exhibits no inverted nipple, no mass, no nipple discharge, no skin change and no tenderness  Left breast exhibits no inverted nipple, no mass, no nipple discharge, no skin change and no tenderness  Breasts are symmetrical    Abdominal: Soft  Normal appearance  Musculoskeletal: Normal range of motion  Lymphadenopathy:     She has no axillary adenopathy  Neurological: She is alert and oriented to person, place, and time  Skin: Skin is warm, dry and intact  Psychiatric: She has a normal mood and affect   Her behavior is normal  Vitals reviewed  ASSESSMENT  Normal exam      PLAN  1  Annual routine mammogram, requisition provided to patient at today's visit  2  Call if AUB reoccurs, consider medical or surgical interventions  Has had Mirena IUD in the past and felt it was uncomfortable  Likely not a great candidate for endo ablation due to dysmenorrhea  3  Return annually for routine GYN exams      All questions were answered & Dejuan Tariq expressed understanding      Marixa Perez

## 2019-05-10 ENCOUNTER — TELEPHONE (OUTPATIENT)
Dept: BARIATRICS | Facility: CLINIC | Age: 49
End: 2019-05-10

## 2020-02-29 ENCOUNTER — OFFICE VISIT (OUTPATIENT)
Dept: URGENT CARE | Facility: CLINIC | Age: 50
End: 2020-02-29
Payer: COMMERCIAL

## 2020-02-29 VITALS
DIASTOLIC BLOOD PRESSURE: 71 MMHG | TEMPERATURE: 98.4 F | WEIGHT: 203 LBS | RESPIRATION RATE: 18 BRPM | HEART RATE: 96 BPM | BODY MASS INDEX: 30.77 KG/M2 | SYSTOLIC BLOOD PRESSURE: 123 MMHG | OXYGEN SATURATION: 96 % | HEIGHT: 68 IN

## 2020-02-29 DIAGNOSIS — J20.8 ACUTE BACTERIAL BRONCHITIS: Primary | ICD-10-CM

## 2020-02-29 DIAGNOSIS — B96.89 ACUTE BACTERIAL BRONCHITIS: Primary | ICD-10-CM

## 2020-02-29 PROCEDURE — 99213 OFFICE O/P EST LOW 20 MIN: CPT | Performed by: PHYSICIAN ASSISTANT

## 2020-02-29 RX ORDER — PREDNISONE 50 MG/1
50 TABLET ORAL DAILY
Qty: 5 TABLET | Refills: 0 | Status: SHIPPED | OUTPATIENT
Start: 2020-02-29 | End: 2020-03-05

## 2020-02-29 RX ORDER — ALBUTEROL SULFATE 90 UG/1
2 AEROSOL, METERED RESPIRATORY (INHALATION) EVERY 6 HOURS PRN
Qty: 8.5 G | Refills: 0 | Status: SHIPPED | OUTPATIENT
Start: 2020-02-29

## 2020-02-29 RX ORDER — BENZONATATE 200 MG/1
200 CAPSULE ORAL 3 TIMES DAILY PRN
Qty: 20 CAPSULE | Refills: 0 | Status: SHIPPED | OUTPATIENT
Start: 2020-02-29

## 2020-02-29 RX ORDER — AZITHROMYCIN 250 MG/1
TABLET, FILM COATED ORAL
Qty: 6 TABLET | Refills: 0 | Status: SHIPPED | OUTPATIENT
Start: 2020-02-29 | End: 2020-03-04

## 2020-02-29 NOTE — PROGRESS NOTES
[facility]        NAME: Belen Cardenas is a 51 y o  female  : 1970    MRN: [id number]  DATE: 2020  TIME: 10:37 AM    Assessment and Plan   Acute bacterial bronchitis [J20 8, B96 89]  1  Acute bacterial bronchitis  azithromycin (ZITHROMAX) 250 mg tablet    predniSONE 50 mg tablet    albuterol (ProAir HFA) 90 mcg/act inhaler    benzonatate (TESSALON) 200 MG capsule         Patient Instructions   Prescriptions sent to the pharmacy for azithromycin, prednisone, albuterol inhaler and Tessalon Perles-use as directed  Saline nasal spray several times daily, Flonase in a m , cool mist humidifier, Tylenol as needed for fever or pain  Follow up with PCP in 3-5 days  Proceed to  ER if symptoms worsen  Chief Complaint     Chief Complaint   Patient presents with    Cough     Pt reports increased cough with chest congestion    Cold Like Symptoms         History of Present Illness   The patient is a 52-year-old female who presents with cold symptoms that have been present for approximately 1 week  She states that she also had a hysterectomy less than 2 weeks ago  Positive nasal and sinus congestion  Mild headache  Positive postnasal drip  Productive cough with occasional shortness of breath and wheezing  Negative fever or chills  Negative abdominal pain, nausea, vomiting or diarrhea  Incisional pain without drainage  HPI    Review of Systems   Review of Systems   Constitutional: Negative for activity change, chills, fatigue and fever  HENT: Positive for congestion, postnasal drip and rhinorrhea  Negative for ear discharge, ear pain, facial swelling, mouth sores, sinus pressure, sinus pain, sneezing, sore throat and trouble swallowing  Eyes: Negative for pain, discharge, redness and itching  Respiratory: Positive for cough, shortness of breath and wheezing  Negative for apnea, chest tightness and stridor  Cardiovascular: Negative for chest pain     Gastrointestinal: Negative for abdominal distention, abdominal pain, diarrhea, nausea and vomiting  Genitourinary: Negative for difficulty urinating  Musculoskeletal: Negative for arthralgias and myalgias  Skin: Negative for pallor and rash  Allergic/Immunologic: Negative  Neurological: Positive for headaches  Negative for dizziness and light-headedness  Hematological: Negative  Psychiatric/Behavioral: Negative  All other systems reviewed and are negative          Current Medications       Current Outpatient Medications:     albuterol (ProAir HFA) 90 mcg/act inhaler, Inhale 2 puffs every 6 (six) hours as needed for wheezing, Disp: 8 5 g, Rfl: 0    azithromycin (ZITHROMAX) 250 mg tablet, Take 2 tablets on day 1 and then 1 tablet daily x 4 days, Disp: 6 tablet, Rfl: 0    benzonatate (TESSALON) 200 MG capsule, Take 1 capsule (200 mg total) by mouth 3 (three) times a day as needed for cough, Disp: 20 capsule, Rfl: 0    Calcium Ascorbate 500 MG TABS, Take 500 mg by mouth, Disp: , Rfl:     Cholecalciferol (VITAMIN D3) 1000 units CAPS, Take by mouth, Disp: , Rfl:     IRON PO, Take by mouth, Disp: , Rfl:     Multiple Vitamin (MULTI-VITAMIN DAILY) TABS, Take 1 tablet by mouth daily, Disp: , Rfl:     predniSONE 50 mg tablet, Take 1 tablet (50 mg total) by mouth daily for 5 days, Disp: 5 tablet, Rfl: 0    Current Allergies     Allergies as of 02/29/2020 - Reviewed 02/29/2020   Allergen Reaction Noted    Nsaids  07/17/2015            The following portions of the patient's history were reviewed and updated as appropriate: allergies, current medications, past family history, past medical history, past social history, past surgical history and problem list      Past Medical History:   Diagnosis Date    Condyloma acuminata 8/8/2012    Obesity     Last assessed 12/27/13- morbid    SLE (systemic lupus erythematosus) (HCC)     Vitamin D deficiency     Last assessed 12/30/14       Past Surgical History:   Procedure Laterality Date    CERVICAL BIOPSY  W/ LOOP ELECTRODE EXCISION      DILATION AND CURETTAGE OF UTERUS      GYNECOLOGIC CRYOSURGERY      cervix    INDUCED   1998    STOMACH SURGERY      bypass    TONSILLECTOMY         Family History   Problem Relation Age of Onset    Heart failure Mother         Congestive    Coronary artery disease Mother     Hypertension Mother     Hypertension Father     Hypertension Sister     Diabetes Brother     Hypertension Brother     Anemia Family     Arthritis Family     Other Family         Blood transfusion    Cervical cancer Family     Cancer Family         Gastric    Ulcers Family     Uterine cancer Family          Medications have been verified  Objective   /71   Pulse 96   Temp 98 4 °F (36 9 °C)   Resp 18   Ht 5' 8" (1 727 m)   Wt 92 1 kg (203 lb)   SpO2 96%   BMI 30 87 kg/m²        Physical Exam     Physical Exam   Constitutional: She is oriented to person, place, and time  She appears well-developed and well-nourished  She appears ill  No distress  HENT:   Head: Normocephalic and atraumatic  Right Ear: Hearing, tympanic membrane, external ear and ear canal normal  No drainage or tenderness  Tympanic membrane is not perforated, not erythematous, not retracted and not bulging  No middle ear effusion  No decreased hearing is noted  Left Ear: Hearing, tympanic membrane, external ear and ear canal normal  No drainage or tenderness  Tympanic membrane is not perforated, not erythematous, not retracted and not bulging  No middle ear effusion  No decreased hearing is noted  Nose: Nose normal  No mucosal edema, rhinorrhea or septal deviation  Right sinus exhibits no maxillary sinus tenderness and no frontal sinus tenderness  Left sinus exhibits no maxillary sinus tenderness and no frontal sinus tenderness  Mouth/Throat: Uvula is midline and mucous membranes are normal  No oral lesions  No dental abscesses or uvula swelling  Posterior oropharyngeal erythema present  No oropharyngeal exudate, posterior oropharyngeal edema or tonsillar abscesses  Eyes: Pupils are equal, round, and reactive to light  Conjunctivae and EOM are normal    Neck: Trachea normal, normal range of motion and full passive range of motion without pain  Neck supple  No JVD present  No edema and no erythema present  No thyromegaly present  Cardiovascular: Normal rate, regular rhythm, S1 normal, S2 normal, normal heart sounds, intact distal pulses and normal pulses  No murmur heard  Pulmonary/Chest: Effort normal and breath sounds normal  No accessory muscle usage or stridor  No tachypnea  No respiratory distress  She has no decreased breath sounds  She has no wheezes  She has no rhonchi  She has no rales  Abdominal: Soft  Normal appearance and bowel sounds are normal  She exhibits no distension  There is no hepatosplenomegaly  There is no tenderness  Musculoskeletal: Normal range of motion  She exhibits no edema  Neurological: She is alert and oriented to person, place, and time  Skin: Skin is warm, dry and intact  No abrasion, no lesion and no rash noted  She is not diaphoretic  No cyanosis or erythema  Nails show no clubbing  Psychiatric: She has a normal mood and affect  Her speech is normal and behavior is normal    Nursing note and vitals reviewed

## 2020-02-29 NOTE — PATIENT INSTRUCTIONS
Prescriptions sent to the pharmacy for azithromycin, prednisone, albuterol inhaler and Tessalon Perles-use as directed  Saline nasal spray several times daily, Flonase in a m , cool mist humidifier, Tylenol as needed for fever or pain  Follow up with PCP in 3-5 days  Proceed to  ER if symptoms worsen  Acute Bronchitis   AMBULATORY CARE:   Acute bronchitis  is swelling and irritation in the air passages of your lungs  This irritation may cause you to cough or have other breathing problems  Acute bronchitis often starts because of another illness, such as a cold or the flu  The illness spreads from your nose and throat to your windpipe and airways  Bronchitis is often called a chest cold  Acute bronchitis lasts about 3 to 6 weeks and is usually not a serious illness  Your cough can last for several weeks  You may have any of the following symptoms:   · A cough with sputum that may be clear, yellow, or green    · Feeling more tired than usual, and body aches    · A fever and chills    · Wheezing when you breathe    · A tight chest or pain when you breathe or cough  Seek care immediately if:   · You cough up blood  · Your lips or fingernails turn blue  · You feel like you are not getting enough air when you breathe  Contact your healthcare provider if:   · You have a fever  · Your breathing problems do not go away or get worse  · Your cough does not get better within 4 weeks  · You have questions or concerns about your condition or care  Self-care:   · Get more rest   Rest helps your body to heal  Slowly start to do more each day  Rest when you feel it is needed  · Avoid irritants in the air  Avoid chemicals, fumes, and dust  Wear a face mask if you must work around dust or fumes  Stay inside on days when air pollution levels are high  If you have allergies, stay inside when pollen counts are high  Do not use aerosol products, such as spray-on deodorant, bug spray, and hair spray      · Do not smoke or be around others who smoke  Nicotine and other chemicals in cigarettes and cigars damages the cilia that move mucus out of your lungs  Ask your healthcare provider for information if you currently smoke and need help to quit  E-cigarettes or smokeless tobacco still contain nicotine  Talk to your healthcare provider before you use these products  · Drink liquids as directed  Liquids help keep your air passages moist and help you cough up mucus  You may need to drink more liquids when you have acute bronchitis  Ask how much liquid to drink each day and which liquids are best for you  · Use a humidifier or vaporizer  Use a cool mist humidifier or a vaporizer to increase air moisture in your home  This may make it easier for you to breathe and help decrease your cough  Prevent acute bronchitis by doing the following:   · Get the vaccinations you need  Ask your healthcare provider if you should get vaccinated against the flu or pneumonia  · Prevent the spread of germs  You can decrease your risk of acute bronchitis and other illnesses by doing the following:     [facility] your hands often with soap and water  Carry germ-killing hand lotion or gel with you  You can use the lotion or gel to clean your hands when soap and water are not available  ¨ Do not touch your eyes, nose, or mouth unless you have washed your hands first     ¨ Always cover your mouth when you cough to prevent the spread of germs  It is best to cough into a tissue or your shirt sleeve instead of into your hand  Ask those around you cover their mouths when they cough  ¨ Try to avoid people who have a cold or the flu  If you are sick, stay away from others as much as possible  Medicines: Your healthcare provider may  give you any of the following:  · Ibuprofen or acetaminophen  are medicines that help lower your fever  They are available without a doctor's order   Ask your healthcare provider which medicine is right for you  Ask how much to take and how often to take it  Follow directions  These medicines can cause stomach bleeding if not taken correctly  Ibuprofen can cause kidney damage  Do not take ibuprofen if you have kidney disease, an ulcer, or allergies to aspirin  Acetaminophen can cause liver damage  Do not take more than 4,000 milligrams in 24 hours  · Decongestants  help loosen mucus in your lungs and make it easier to cough up  This can help you breathe easier  · Cough suppressants  decrease your urge to cough  If your cough produces mucus, do not take a cough suppressant unless your healthcare provider tells you to  Your healthcare provider may suggest that you take a cough suppressant at night so you can rest     · Inhalers  may be given  Your healthcare provider may give you one or more inhalers to help you breathe easier and cough less  An inhaler gives your medicine to open your airways  Ask your healthcare provider to show you how to use your inhaler correctly  Follow up with your healthcare provider as directed:  Write down questions you have so you will remember to ask them during your follow-up visits  © 2017 [employer] Information is for End User's use only and may not be sold, redistributed or otherwise used for commercial purposes  All illustrations and images included in CareNotes® are the copyrighted property of [employer]  or [employer]  The above information is an  only  It is not intended as medical advice for individual conditions or treatments  Talk to your doctor, nurse or pharmacist before following any medical regimen to see if it is safe and effective for you

## 2020-05-11 ENCOUNTER — TELEPHONE (OUTPATIENT)
Dept: BARIATRICS | Facility: CLINIC | Age: 50
End: 2020-05-11

## 2023-01-17 ENCOUNTER — APPOINTMENT (EMERGENCY)
Dept: CT IMAGING | Facility: HOSPITAL | Age: 53
End: 2023-01-17

## 2023-01-17 ENCOUNTER — HOSPITAL ENCOUNTER (EMERGENCY)
Facility: HOSPITAL | Age: 53
Discharge: HOME | End: 2023-01-17
Attending: EMERGENCY MEDICINE

## 2023-01-17 VITALS
RESPIRATION RATE: 17 BRPM | SYSTOLIC BLOOD PRESSURE: 118 MMHG | HEART RATE: 68 BPM | TEMPERATURE: 98 F | OXYGEN SATURATION: 99 % | DIASTOLIC BLOOD PRESSURE: 60 MMHG

## 2023-01-17 DIAGNOSIS — R51.9 LEFT TEMPORAL HEADACHE: Primary | ICD-10-CM

## 2023-01-17 LAB
ALBUMIN SERPL BCP-MCNC: 4.2 G/DL (ref 3.5–5)
ALP SERPL-CCNC: 70 U/L (ref 34–104)
ALT SERPL W P-5'-P-CCNC: 12 U/L (ref 7–52)
ANION GAP SERPL CALCULATED.3IONS-SCNC: 8 MMOL/L (ref 4–13)
AST SERPL W P-5'-P-CCNC: 24 U/L (ref 13–39)
BASOPHILS # BLD AUTO: 0.04 THOUSANDS/ÂΜL (ref 0–0.1)
BASOPHILS NFR BLD AUTO: 1 % (ref 0–1)
BILIRUB SERPL-MCNC: 1.29 MG/DL (ref 0.2–1)
BUN SERPL-MCNC: 11 MG/DL (ref 5–25)
CALCIUM SERPL-MCNC: 9.3 MG/DL (ref 8.4–10.2)
CHLORIDE SERPL-SCNC: 103 MMOL/L (ref 96–108)
CO2 SERPL-SCNC: 26 MMOL/L (ref 21–32)
CREAT SERPL-MCNC: 0.69 MG/DL (ref 0.6–1.3)
CRP SERPL QL: 4.3 MG/L
EOSINOPHIL # BLD AUTO: 0.15 THOUSAND/ÂΜL (ref 0–0.61)
EOSINOPHIL NFR BLD AUTO: 4 % (ref 0–6)
ERYTHROCYTE [DISTWIDTH] IN BLOOD BY AUTOMATED COUNT: 13.2 % (ref 11.6–15.1)
ERYTHROCYTE [SEDIMENTATION RATE] IN BLOOD: 6 MM/HOUR (ref 0–29)
GFR SERPL CREATININE-BSD FRML MDRD: 101 ML/MIN/1.73SQ M
GLUCOSE SERPL-MCNC: 93 MG/DL (ref 65–140)
HCT VFR BLD AUTO: 42 % (ref 34.8–46.1)
HGB BLD-MCNC: 13.4 G/DL (ref 11.5–15.4)
IMM GRANULOCYTES # BLD AUTO: 0.01 THOUSAND/UL (ref 0–0.2)
IMM GRANULOCYTES NFR BLD AUTO: 0 % (ref 0–2)
LYMPHOCYTES # BLD AUTO: 1.69 THOUSANDS/ÂΜL (ref 0.6–4.47)
LYMPHOCYTES NFR BLD AUTO: 41 % (ref 14–44)
MCH RBC QN AUTO: 31.8 PG (ref 26.8–34.3)
MCHC RBC AUTO-ENTMCNC: 31.9 G/DL (ref 31.4–37.4)
MCV RBC AUTO: 100 FL (ref 82–98)
MONOCYTES # BLD AUTO: 0.53 THOUSAND/ÂΜL (ref 0.17–1.22)
MONOCYTES NFR BLD AUTO: 13 % (ref 4–12)
NEUTROPHILS # BLD AUTO: 1.66 THOUSANDS/ÂΜL (ref 1.85–7.62)
NEUTS SEG NFR BLD AUTO: 41 % (ref 43–75)
NRBC BLD AUTO-RTO: 0 /100 WBCS
PLATELET # BLD AUTO: 225 THOUSANDS/UL (ref 149–390)
PMV BLD AUTO: 10.6 FL (ref 8.9–12.7)
POTASSIUM SERPL-SCNC: 3.8 MMOL/L (ref 3.5–5.3)
PROT SERPL-MCNC: 6.9 G/DL (ref 6.4–8.4)
RBC # BLD AUTO: 4.22 MILLION/UL (ref 3.81–5.12)
SODIUM SERPL-SCNC: 137 MMOL/L (ref 135–147)
WBC # BLD AUTO: 4.08 THOUSAND/UL (ref 4.31–10.16)

## 2023-01-17 RX ORDER — HYDROXYCHLOROQUINE SULFATE 200 MG/1
200 TABLET, FILM COATED ORAL 2 TIMES DAILY
COMMUNITY
Start: 2022-09-14

## 2023-01-17 RX ORDER — DIPHENHYDRAMINE HYDROCHLORIDE 50 MG/ML
12.5 INJECTION INTRAMUSCULAR; INTRAVENOUS ONCE
Status: COMPLETED | OUTPATIENT
Start: 2023-01-17 | End: 2023-01-17

## 2023-01-17 RX ORDER — BUTALBITAL, ACETAMINOPHEN AND CAFFEINE 50; 325; 40 MG/1; MG/1; MG/1
1 TABLET ORAL EVERY 6 HOURS PRN
Qty: 20 TABLET | Refills: 0 | Status: SHIPPED | OUTPATIENT
Start: 2023-01-17 | End: 2023-01-27

## 2023-01-17 RX ORDER — OMEPRAZOLE 20 MG/1
20 CAPSULE, DELAYED RELEASE ORAL DAILY
COMMUNITY

## 2023-01-17 RX ORDER — DOXEPIN HYDROCHLORIDE 25 MG/1
5 CAPSULE ORAL
COMMUNITY

## 2023-01-17 RX ORDER — DEXAMETHASONE SODIUM PHOSPHATE 4 MG/ML
10 INJECTION, SOLUTION INTRA-ARTICULAR; INTRALESIONAL; INTRAMUSCULAR; INTRAVENOUS; SOFT TISSUE ONCE
Status: COMPLETED | OUTPATIENT
Start: 2023-01-17 | End: 2023-01-17

## 2023-01-17 RX ORDER — BUTALBITAL, ACETAMINOPHEN AND CAFFEINE 50; 325; 40 MG/1; MG/1; MG/1
1 TABLET ORAL EVERY 6 HOURS PRN
Qty: 20 TABLET | Refills: 0 | Status: SHIPPED | OUTPATIENT
Start: 2023-01-17 | End: 2023-01-17 | Stop reason: SDUPTHER

## 2023-01-17 RX ORDER — MAGNESIUM SULFATE HEPTAHYDRATE 40 MG/ML
2 INJECTION, SOLUTION INTRAVENOUS ONCE
Status: COMPLETED | OUTPATIENT
Start: 2023-01-17 | End: 2023-01-17

## 2023-01-17 RX ORDER — METOCLOPRAMIDE HYDROCHLORIDE 5 MG/ML
10 INJECTION INTRAMUSCULAR; INTRAVENOUS ONCE
Status: COMPLETED | OUTPATIENT
Start: 2023-01-17 | End: 2023-01-17

## 2023-01-17 RX ADMIN — DIPHENHYDRAMINE HYDROCHLORIDE 12.5 MG: 50 INJECTION, SOLUTION INTRAMUSCULAR; INTRAVENOUS at 18:35

## 2023-01-17 RX ADMIN — DEXAMETHASONE SODIUM PHOSPHATE 10 MG: 4 INJECTION INTRA-ARTICULAR; INTRALESIONAL; INTRAMUSCULAR; INTRAVENOUS; SOFT TISSUE at 18:35

## 2023-01-17 RX ADMIN — MAGNESIUM SULFATE HEPTAHYDRATE 2 G: 40 INJECTION, SOLUTION INTRAVENOUS at 18:48

## 2023-01-17 RX ADMIN — IOHEXOL 85 ML: 350 INJECTION, SOLUTION INTRAVENOUS at 19:34

## 2023-01-17 RX ADMIN — METOCLOPRAMIDE 10 MG: 5 INJECTION, SOLUTION INTRAMUSCULAR; INTRAVENOUS at 18:35

## 2023-01-17 NOTE — ED PROVIDER NOTES
History  Chief Complaint   Patient presents with   • Headache     Hx of meningioma, last saw neurology in 2016  Reports headaches normally have pain in posterior head  This past Friday, sharp stabbing pains started in left side of forehead  Denies n/v/d  History provided by:  Patient   used: No    71-year-old female with history of SLE on hydroxychloroquine presented for evaluation of about 5 days of severe left temporal pulsatile headache  States that the intensity of pain waxes and wanes  Symptoms worse when lying down  Also notes that the left temporal area is tender to the touch and she is unable to rest her head on that side  Denies any other associated symptoms including visual changes, nausea, vomiting, fever, neck pain  No injuries  Ports a history of meningioma, last Neuroimaging was 7 years ago  She appears uncomfortable but maintains normal vitals  No focal neurologic deficits  She has no rashes  Tenderness of the temporal area only  No frontal or parietal scalp tenderness  No carotid bruit  Plan labs, imaging, symptomatic treatment, reevaluate  History of gastric sleeve--unable to take NSAIDs  Prior to Admission Medications   Prescriptions Last Dose Informant Patient Reported? Taking?    Calcium Ascorbate 500 MG TABS 1/17/2023  Yes Yes   Sig: Take 500 mg by mouth   Cholecalciferol (VITAMIN D3) 1000 units CAPS 1/17/2023  Yes Yes   Sig: Take by mouth   IRON PO Not Taking  Yes No   Sig: Take by mouth   Patient not taking: Reported on 1/17/2023   Multiple Vitamin (MULTI-VITAMIN DAILY) TABS 1/17/2023  Yes Yes   Sig: Take 1 tablet by mouth daily   albuterol (ProAir HFA) 90 mcg/act inhaler Not Taking  No No   Sig: Inhale 2 puffs every 6 (six) hours as needed for wheezing   Patient not taking: Reported on 1/17/2023   benzonatate (TESSALON) 200 MG capsule Not Taking  No No   Sig: Take 1 capsule (200 mg total) by mouth 3 (three) times a day as needed for cough   Patient not taking: Reported on 2023   doxepin (SINEquan) 25 mg capsule 2023  Yes Yes   Sig: Take 5 mg by mouth daily at bedtime   hydroxychloroquine (PLAQUENIL) 200 mg tablet 2023  Yes Yes   Sig: Take 200 mg by mouth 2 (two) times a day   omeprazole (PriLOSEC) 20 mg delayed release capsule 2023  Yes Yes   Sig: Take 20 mg by mouth daily      Facility-Administered Medications: None       Past Medical History:   Diagnosis Date   • Condyloma acuminata 2012   • Obesity     Last assessed 13- morbid   • SLE (systemic lupus erythematosus) ([facility] [address] )    • Vitamin D deficiency     Last assessed 14       Past Surgical History:   Procedure Laterality Date   • CERVICAL BIOPSY  W/ LOOP ELECTRODE EXCISION     • DILATION AND CURETTAGE OF UTERUS     • GYNECOLOGIC CRYOSURGERY      cervix   • INDUCED   1998   • STOMACH SURGERY      bypass   • TONSILLECTOMY         Family History   Problem Relation Age of Onset   • Heart failure Mother         Congestive   • Coronary artery disease Mother    • Hypertension Mother    • Hypertension Father    • Hypertension Sister    • Diabetes Brother    • Hypertension Brother    • Anemia Family    • Arthritis Family    • Other Family         Blood transfusion   • Cervical cancer Family    • Cancer Family         Gastric   • Ulcers Family    • Uterine cancer Family      I have reviewed and agree with the history as documented  E-Cigarette/Vaping   • E-Cigarette Use Never User      E-Cigarette/Vaping Substances     Social History     Tobacco Use   • Smoking status: Never   • Smokeless tobacco: Never   Vaping Use   • Vaping Use: Never used   Substance Use Topics   • Alcohol use: Yes     Comment: social   • Drug use: No       Review of Systems   Constitutional: Negative for activity change, appetite change, fatigue and fever  Eyes: Negative for photophobia, redness and visual disturbance     Respiratory: Negative for cough, chest tightness and shortness of breath  Cardiovascular: Negative for chest pain  Gastrointestinal: Negative for abdominal pain, nausea and vomiting  Musculoskeletal: Negative for back pain and neck pain  Skin: Negative for color change and wound  Neurological: Positive for headaches  Negative for dizziness and weakness  All other systems reviewed and are negative  Physical Exam  Physical Exam  Vitals and nursing note reviewed  Constitutional:       Comments: Appears uncomfortable  HENT:      Head: Normocephalic and atraumatic  Comments: Tenderness of the left temporal area  Nose: Nose normal  No congestion  Mouth/Throat:      Mouth: Mucous membranes are moist       Pharynx: Oropharynx is clear  Eyes:      Extraocular Movements: Extraocular movements intact  Conjunctiva/sclera: Conjunctivae normal       Pupils: Pupils are equal, round, and reactive to light  Neck:      Vascular: No carotid bruit  Cardiovascular:      Rate and Rhythm: Normal rate and regular rhythm  Comments: Systolic click  Pulmonary:      Effort: Pulmonary effort is normal       Breath sounds: Normal breath sounds  Musculoskeletal:         General: No swelling  Normal range of motion  Cervical back: Normal range of motion and neck supple  No tenderness  Skin:     General: Skin is warm and dry  Capillary Refill: Capillary refill takes less than 2 seconds  Neurological:      General: No focal deficit present  Mental Status: She is alert and oriented to person, place, and time     Psychiatric:         Mood and Affect: Mood normal          Behavior: Behavior normal          Vital Signs  ED Triage Vitals [01/17/23 1722]   Temperature Pulse Respirations Blood Pressure SpO2   98 °F (36 7 °C) 68 18 156/67 100 %      Temp Source Heart Rate Source Patient Position - Orthostatic VS BP Location FiO2 (%)   Oral Monitor Sitting Right arm --      Pain Score       --           Vitals:    01/17/23 1722 01/17/23 1944   BP: 156/67 118/60   Pulse: 68 68   Patient Position - Orthostatic VS: Sitting          Visual Acuity  Visual Acuity    Flowsheet Row Most Recent Value   L Pupil Size (mm) 3   R Pupil Size (mm) 3          ED Medications  Medications   dexamethasone (DECADRON) injection 10 mg (10 mg Intravenous Given 1/17/23 1835)   metoclopramide (REGLAN) injection 10 mg (10 mg Intravenous Given 1/17/23 1835)   diphenhydrAMINE (BENADRYL) injection 12 5 mg (12 5 mg Intravenous Given 1/17/23 1835)   magnesium sulfate 2 g/50 mL IVPB (premix) 2 g (0 g Intravenous Stopped 1/17/23 1946)   iohexol (OMNIPAQUE) 350 MG/ML injection (MULTI-DOSE) 85 mL (85 mL Intravenous Given 1/17/23 1934)       Diagnostic Studies  Results Reviewed     Procedure Component Value Units Date/Time    Comprehensive metabolic panel [[id number]]  (Abnormal) Collected: 01/17/23 1834    Lab Status: Final result Specimen: Blood from Arm, Left Updated: 01/17/23 1922     Sodium 137 mmol/L      Potassium 3 8 mmol/L      Chloride 103 mmol/L      CO2 26 mmol/L      ANION GAP 8 mmol/L      BUN 11 mg/dL      Creatinine 0 69 mg/dL      Glucose 93 mg/dL      Calcium 9 3 mg/dL      AST 24 U/L      ALT 12 U/L      Alkaline Phosphatase 70 U/L      Total Protein 6 9 g/dL      Albumin 4 2 g/dL      Total Bilirubin 1 29 mg/dL      eGFR 101 ml/min/1 73sq m     Narrative:      Daniel guidelines for Chronic Kidney Disease (CKD):   •  Stage 1 with normal or high GFR (GFR > 90 mL/min/1 73 square meters)  •  Stage 2 Mild CKD (GFR = 60-89 mL/min/1 73 square meters)  •  Stage 3A Moderate CKD (GFR = 45-59 mL/min/1 73 square meters)  •  Stage 3B Moderate CKD (GFR = 30-44 mL/min/1 73 square meters)  •  Stage 4 Severe CKD (GFR = 15-29 mL/min/1 73 square meters)  •  Stage 5 End Stage CKD (GFR <15 mL/min/1 73 square meters)  Note: GFR calculation is accurate only with a steady state creatinine    C-reactive protein [[id number]]  (Abnormal) Collected: 01/17/23 1834    Lab Status: Final result Specimen: Blood from Arm, Left Updated: 01/17/23 1922     CRP 4 3 mg/L     Sedimentation rate, automated [[id number]]  (Normal) Collected: 01/17/23 1834    Lab Status: Final result Specimen: Blood from Arm, Left Updated: 01/17/23 1911     Sed Rate 6 mm/hour     CBC and differential [[id number]]  (Abnormal) Collected: 01/17/23 1834    Lab Status: Final result Specimen: Blood from Arm, Left Updated: 01/17/23 1854     WBC 4 08 Thousand/uL      RBC 4 22 Million/uL      Hemoglobin 13 4 g/dL      Hematocrit 42 0 %       fL      MCH 31 8 pg      MCHC 31 9 g/dL      RDW 13 2 %      MPV 10 6 fL      Platelets 348 Thousands/uL      nRBC 0 /100 WBCs      Neutrophils Relative 41 %      Immat GRANS % 0 %      Lymphocytes Relative 41 %      Monocytes Relative 13 %      Eosinophils Relative 4 %      Basophils Relative 1 %      Neutrophils Absolute 1 66 Thousands/µL      Immature Grans Absolute 0 01 Thousand/uL      Lymphocytes Absolute 1 69 Thousands/µL      Monocytes Absolute 0 53 Thousand/µL      Eosinophils Absolute 0 15 Thousand/µL      Basophils Absolute 0 04 Thousands/µL                  CTA head with and without contrast   Final Result by Saad Umaña MD (01/17 2124)         1  Mild interval increase in size of left parietal parafalcine meningioma measuring 1 8 cm  No mass effect or surrounding vasogenic edema  2   No acute intracranial hemorrhage  3   No intracranial aneurysm  No stenosis or occlusion of the major vessels of the [place]  Workstation performed: [id number]                    Procedures  Procedures         ED Course  ED Course as of 01/17/23 2143 Tue Jan 17, 2023 1914 Sed Rate: 6   2137 Reported initially feeling worse but then some improvement after medication  She still having pain but reports it is less frequent and tolerable at the moment  Her work-up overall is unremarkable  Stable for discharge home    She plans to follow-up with her neurologist                                SBIRT 20yo+    Flowsheet Row Most Recent Value   SBIRT (23 yo +)    In order to provide better care to our patients, we are screening all of our patients for alcohol and drug use  Would it be okay to ask you these screening questions? Unable to answer at this time Filed at: 01/17/2023 1824                    Medical Decision Making  52-year-old female presented with pulsatile left temporal headache over the last few days  States different than prior headaches that she has had  No injuries  No associated neurologic deficits  No fever, chills or other infectious symptoms  Reproducible tenderness over the left temporal scalp only  No rashes  Lab work unremarkable  No significant inflammatory marker elevation  CTA unremarkable as well -- showed slight increase in left parietal meningioma from 6 years ago but otherwise no acute changes  Patient did have some improvement with medication here in the ED  Stable for discharge home, PCP/neurology follow-up  Left temporal headache: acute illness or injury  Amount and/or Complexity of Data Reviewed  Labs: ordered  Decision-making details documented in ED Course  Risk  Prescription drug management  Disposition  Final diagnoses:   Left temporal headache     Time reflects when diagnosis was documented in both MDM as applicable and the Disposition within this note     Time User Action Codes Description Comment    1/17/2023  9:38 PM Munir Castellano Add [R51 9] Left temporal headache       ED Disposition     ED Disposition   Discharge    Condition   Stable    Date/Time   Tue Jan 17, 2023  9:38 PM    Comment   Alida Fofana discharge to home/self care  Follow-up Information     Follow up With Specialties Details Why Philip Gavin MD Internal Medicine   [address]    [address] [place] [address]  [phone]            Patient's Medications   Discharge Prescriptions    BUTALBITAL-ACETAMINOPHEN-CAFFEINE (ESGIC) -40 MG PER TABLET    Take 1 tablet by mouth every 6 (six) hours as needed for headaches (May cause drowsiness)       Start Date: 1/17/2023 End Date: --       Order Dose: 1 tablet       Quantity: 20 tablet    Refills: 0       No discharge procedures on file      PDMP Review     None          ED Provider  Electronically Signed by           Eugenie August MD  01/17/23 9470

## 2024-02-08 ENCOUNTER — HOSPITAL ENCOUNTER (EMERGENCY)
Facility: HOSPITAL | Age: 54
Discharge: HOME | End: 2024-02-08
Attending: EMERGENCY MEDICINE
Payer: COMMERCIAL

## 2024-02-08 ENCOUNTER — APPOINTMENT (EMERGENCY)
Dept: RADIOLOGY | Facility: HOSPITAL | Age: 54
End: 2024-02-08
Payer: COMMERCIAL

## 2024-02-08 ENCOUNTER — APPOINTMENT (EMERGENCY)
Dept: CT IMAGING | Facility: HOSPITAL | Age: 54
End: 2024-02-08
Payer: COMMERCIAL

## 2024-02-08 VITALS
HEART RATE: 63 BPM | RESPIRATION RATE: 15 BRPM | TEMPERATURE: 97.9 F | DIASTOLIC BLOOD PRESSURE: 56 MMHG | OXYGEN SATURATION: 97 % | SYSTOLIC BLOOD PRESSURE: 102 MMHG

## 2024-02-08 DIAGNOSIS — M41.9 SCOLIOSIS: ICD-10-CM

## 2024-02-08 DIAGNOSIS — R07.9 CHEST PAIN: Primary | ICD-10-CM

## 2024-02-08 DIAGNOSIS — M54.9 BACK PAIN: ICD-10-CM

## 2024-02-08 LAB
ALBUMIN SERPL BCP-MCNC: 4.1 G/DL (ref 3.5–5)
ALP SERPL-CCNC: 76 U/L (ref 34–104)
ALT SERPL W P-5'-P-CCNC: 16 U/L (ref 7–52)
ANION GAP SERPL CALCULATED.3IONS-SCNC: 5 MMOL/L
AST SERPL W P-5'-P-CCNC: 22 U/L (ref 13–39)
ATRIAL RATE: 78 BPM
BASOPHILS # BLD AUTO: 0.05 THOUSANDS/ÂΜL (ref 0–0.1)
BASOPHILS NFR BLD AUTO: 1 % (ref 0–1)
BILIRUB SERPL-MCNC: 0.78 MG/DL (ref 0.2–1)
BILIRUB UR QL STRIP: NEGATIVE
BUN SERPL-MCNC: 17 MG/DL (ref 5–25)
CALCIUM SERPL-MCNC: 9 MG/DL (ref 8.4–10.2)
CARDIAC TROPONIN I PNL SERPL HS: <2 NG/L
CHLORIDE SERPL-SCNC: 108 MMOL/L (ref 96–108)
CK SERPL-CCNC: 113 U/L (ref 26–192)
CLARITY UR: CLEAR
CO2 SERPL-SCNC: 26 MMOL/L (ref 21–32)
COLOR UR: COLORLESS
CREAT SERPL-MCNC: 0.83 MG/DL (ref 0.6–1.3)
EOSINOPHIL # BLD AUTO: 0.33 THOUSAND/ÂΜL (ref 0–0.61)
EOSINOPHIL NFR BLD AUTO: 7 % (ref 0–6)
ERYTHROCYTE [DISTWIDTH] IN BLOOD BY AUTOMATED COUNT: 12.6 % (ref 11.6–15.1)
GFR SERPL CREATININE-BSD FRML MDRD: 81 ML/MIN/1.73SQ M
GLUCOSE SERPL-MCNC: 83 MG/DL (ref 65–140)
GLUCOSE UR STRIP-MCNC: NEGATIVE MG/DL
HCT VFR BLD AUTO: 39.9 % (ref 34.8–46.1)
HGB BLD-MCNC: 12.9 G/DL (ref 11.5–15.4)
HGB UR QL STRIP.AUTO: NEGATIVE
IMM GRANULOCYTES # BLD AUTO: 0.01 THOUSAND/UL (ref 0–0.2)
IMM GRANULOCYTES NFR BLD AUTO: 0 % (ref 0–2)
KETONES UR STRIP-MCNC: NEGATIVE MG/DL
LEUKOCYTE ESTERASE UR QL STRIP: NEGATIVE
LIPASE SERPL-CCNC: 31 U/L (ref 11–82)
LYMPHOCYTES # BLD AUTO: 1.73 THOUSANDS/ÂΜL (ref 0.6–4.47)
LYMPHOCYTES NFR BLD AUTO: 37 % (ref 14–44)
MCH RBC QN AUTO: 31.5 PG (ref 26.8–34.3)
MCHC RBC AUTO-ENTMCNC: 32.3 G/DL (ref 31.4–37.4)
MCV RBC AUTO: 97 FL (ref 82–98)
MONOCYTES # BLD AUTO: 0.43 THOUSAND/ÂΜL (ref 0.17–1.22)
MONOCYTES NFR BLD AUTO: 9 % (ref 4–12)
NEUTROPHILS # BLD AUTO: 2.12 THOUSANDS/ÂΜL (ref 1.85–7.62)
NEUTS SEG NFR BLD AUTO: 46 % (ref 43–75)
NITRITE UR QL STRIP: NEGATIVE
NRBC BLD AUTO-RTO: 0 /100 WBCS
P AXIS: 79 DEGREES
PH UR STRIP.AUTO: 5.5 [PH]
PLATELET # BLD AUTO: 188 THOUSANDS/UL (ref 149–390)
PMV BLD AUTO: 10 FL (ref 8.9–12.7)
POTASSIUM SERPL-SCNC: 3.6 MMOL/L (ref 3.5–5.3)
PR INTERVAL: 162 MS
PROT SERPL-MCNC: 6.5 G/DL (ref 6.4–8.4)
PROT UR STRIP-MCNC: NEGATIVE MG/DL
QRS AXIS: 72 DEGREES
QRSD INTERVAL: 84 MS
QT INTERVAL: 388 MS
QTC INTERVAL: 442 MS
RBC # BLD AUTO: 4.1 MILLION/UL (ref 3.81–5.12)
SODIUM SERPL-SCNC: 139 MMOL/L (ref 135–147)
SP GR UR STRIP.AUTO: 1 (ref 1–1.03)
T WAVE AXIS: 39 DEGREES
UROBILINOGEN UR STRIP-ACNC: <2 MG/DL
VENTRICULAR RATE: 78 BPM
WBC # BLD AUTO: 4.67 THOUSAND/UL (ref 4.31–10.16)

## 2024-02-08 PROCEDURE — 71045 X-RAY EXAM CHEST 1 VIEW: CPT

## 2024-02-08 PROCEDURE — 85025 COMPLETE CBC W/AUTO DIFF WBC: CPT

## 2024-02-08 PROCEDURE — 74174 CTA ABD&PLVS W/CONTRAST: CPT

## 2024-02-08 PROCEDURE — 99285 EMERGENCY DEPT VISIT HI MDM: CPT | Performed by: EMERGENCY MEDICINE

## 2024-02-08 PROCEDURE — 96374 THER/PROPH/DIAG INJ IV PUSH: CPT

## 2024-02-08 PROCEDURE — 81003 URINALYSIS AUTO W/O SCOPE: CPT | Performed by: EMERGENCY MEDICINE

## 2024-02-08 PROCEDURE — 84484 ASSAY OF TROPONIN QUANT: CPT

## 2024-02-08 PROCEDURE — 93005 ELECTROCARDIOGRAM TRACING: CPT

## 2024-02-08 PROCEDURE — 80053 COMPREHEN METABOLIC PANEL: CPT

## 2024-02-08 PROCEDURE — 99285 EMERGENCY DEPT VISIT HI MDM: CPT

## 2024-02-08 PROCEDURE — 36415 COLL VENOUS BLD VENIPUNCTURE: CPT

## 2024-02-08 PROCEDURE — 83690 ASSAY OF LIPASE: CPT

## 2024-02-08 PROCEDURE — 71275 CT ANGIOGRAPHY CHEST: CPT

## 2024-02-08 PROCEDURE — 96361 HYDRATE IV INFUSION ADD-ON: CPT

## 2024-02-08 PROCEDURE — 82550 ASSAY OF CK (CPK): CPT | Performed by: EMERGENCY MEDICINE

## 2024-02-08 PROCEDURE — G1004 CDSM NDSC: HCPCS

## 2024-02-08 RX ORDER — MORPHINE SULFATE 4 MG/ML
4 INJECTION, SOLUTION INTRAMUSCULAR; INTRAVENOUS ONCE
Status: COMPLETED | OUTPATIENT
Start: 2024-02-08 | End: 2024-02-08

## 2024-02-08 RX ORDER — LIDOCAINE 50 MG/G
1 PATCH TOPICAL ONCE
Status: DISCONTINUED | OUTPATIENT
Start: 2024-02-08 | End: 2024-02-08 | Stop reason: HOSPADM

## 2024-02-08 RX ORDER — CYCLOBENZAPRINE HCL 10 MG
10 TABLET ORAL ONCE
Status: COMPLETED | OUTPATIENT
Start: 2024-02-08 | End: 2024-02-08

## 2024-02-08 RX ORDER — SUCRALFATE 1 G/1
1 TABLET ORAL 4 TIMES DAILY
Qty: 28 TABLET | Refills: 0 | Status: SHIPPED | OUTPATIENT
Start: 2024-02-08 | End: 2024-02-15

## 2024-02-08 RX ORDER — CYCLOBENZAPRINE HCL 10 MG
10 TABLET ORAL 2 TIMES DAILY PRN
Qty: 20 TABLET | Refills: 0 | Status: SHIPPED | OUTPATIENT
Start: 2024-02-08

## 2024-02-08 RX ORDER — ACETAMINOPHEN 325 MG/1
975 TABLET ORAL ONCE
Status: COMPLETED | OUTPATIENT
Start: 2024-02-08 | End: 2024-02-08

## 2024-02-08 RX ORDER — MAGNESIUM HYDROXIDE/ALUMINUM HYDROXICE/SIMETHICONE 120; 1200; 1200 MG/30ML; MG/30ML; MG/30ML
30 SUSPENSION ORAL ONCE
Status: COMPLETED | OUTPATIENT
Start: 2024-02-08 | End: 2024-02-08

## 2024-02-08 RX ADMIN — LIDOCAINE 1 PATCH: 50 PATCH CUTANEOUS at 04:38

## 2024-02-08 RX ADMIN — SODIUM CHLORIDE 1000 ML: 0.9 INJECTION, SOLUTION INTRAVENOUS at 04:39

## 2024-02-08 RX ADMIN — MORPHINE SULFATE 4 MG: 4 INJECTION INTRAVENOUS at 04:45

## 2024-02-08 RX ADMIN — IOHEXOL 100 ML: 350 INJECTION, SOLUTION INTRAVENOUS at 05:28

## 2024-02-08 RX ADMIN — CYCLOBENZAPRINE 10 MG: 10 TABLET, FILM COATED ORAL at 04:37

## 2024-02-08 RX ADMIN — ACETAMINOPHEN 975 MG: 325 TABLET, FILM COATED ORAL at 04:37

## 2024-02-08 RX ADMIN — ALUMINUM HYDROXIDE, MAGNESIUM HYDROXIDE, AND DIMETHICONE 30 ML: 200; 20; 200 SUSPENSION ORAL at 04:50

## 2024-02-08 NOTE — ED ATTENDING ATTESTATION
2/8/2024  I, Dane Meraz MD, saw and evaluated the patient. I have discussed the patient with the resident/non-physician practitioner and agree with the resident's/non-physician practitioner's findings, Plan of Care, and MDM as documented in the resident's/non-physician practitioner's note, except where noted. All available labs and Radiology studies were reviewed.  I was present for key portions of any procedure(s) performed by the resident/non-physician practitioner and I was immediately available to provide assistance.       At this point I agree with the current assessment done in the Emergency Department.  I have conducted an independent evaluation of this patient a history and physical is as follows: Patient is a 53 year old female with h/o SLE with intermittent chest pain for past 5 days with radiation to the mid back this AM. (+) nausea. No vomiting or diarrhea. No fever. No sob. No cough. No travel. Has had prior gastric bypass and hysterectomy. Occasional etoh use. Pain worse with eating. (+) belching often. Was last seen in this ED on 1/17/23 for headache. [employer] website checked on this patient and last Rx filled was on 1/18/23 for fioricet for 5 day supply. NCAT. Moist mucous membranes. No scleral icterus. Lungs clear. Heart regular without murmur. Nontender chest wall. Abdomen soft and nontender. Good bowel sounds. Mild bilateral LE edema. No rash noted. No jaundice. Back nontender. No CVAT. No focal deficits. DDX including but not limited to: ACS, MI, PE, PTX, pneumonia, dissection, pleurisy, pericarditis, myocarditis, rhabdomyolysis, GI etiology. I reviewed EKG. Will check labs, CXR and CT dissection study.     ED Course         Critical Care Time  Procedures

## 2024-02-08 NOTE — Clinical Note
Lina Johnson was seen and treated in our emergency department on 2/8/2024.    No restrictions            Diagnosis:     Lina  .    She may return on this date: 02/10/2024         If you have any questions or concerns, please don't hesitate to call.      Marquise Hendrix MD    ______________________________           _______________          _______________  Hospital Representative                              Date                                Time

## 2024-02-08 NOTE — ED PROVIDER NOTES
History  Chief Complaint   Patient presents with    Chest Pain     Pt presents to the ED with c/o sharp chest pain that has been occurring for 5 days. Radiates to back, nauseas, fatigued.      53-year-old female with history of SLE presents with chest pain and back pain.    Acute onset of stabbing/sharp right paraspinal back pain that had woken her up from her sleep 45 minutes ago.  Exacerbated by movement and pressure.  Has not attempted anything for relief at this time.  Denies trauma, recent activities, injury, MVC.    Patient states 5-day history of gas-like left upper chest pain with persistent belching that is exacerbated by any consumption of food or water.  Has attempted Tylenol and omeprazole without relief.  Denies shortness of breath, exertional dyspnea, leg swelling, PND, orthopnea.    History of SLE on hydroxychloroquine.  Denies alcohol use, tobacco use, recreational drug use.    Associated nausea.  Denies fevers, chills, vomiting, abdominal pain, dysuria, frequency, urgency, and urea, changes in urinary or bowel habits.        Chest Pain      Prior to Admission Medications   Prescriptions Last Dose Informant Patient Reported? Taking?   Calcium Ascorbate 500 MG TABS   Yes No   Sig: Take 500 mg by mouth   Cholecalciferol (VITAMIN D3) 1000 units CAPS   Yes No   Sig: Take by mouth   IRON PO   Yes No   Sig: Take by mouth   Patient not taking: Reported on 1/17/2023   Multiple Vitamin (MULTI-VITAMIN DAILY) TABS   Yes No   Sig: Take 1 tablet by mouth daily   albuterol (ProAir HFA) 90 mcg/act inhaler   No No   Sig: Inhale 2 puffs every 6 (six) hours as needed for wheezing   Patient not taking: Reported on 1/17/2023   benzonatate (TESSALON) 200 MG capsule   No No   Sig: Take 1 capsule (200 mg total) by mouth 3 (three) times a day as needed for cough   Patient not taking: Reported on 1/17/2023   doxepin (SINEquan) 25 mg capsule   Yes No   Sig: Take 5 mg by mouth daily at bedtime   hydroxychloroquine (PLAQUENIL)  200 mg tablet   Yes No   Sig: Take 200 mg by mouth 2 (two) times a day   omeprazole (PriLOSEC) 20 mg delayed release capsule   Yes No   Sig: Take 20 mg by mouth daily      Facility-Administered Medications: None       Past Medical History:   Diagnosis Date    Condyloma acuminata 2012    Obesity     Last assessed 13- morbid    SLE (systemic lupus erythematosus) (HCC)     Vitamin D deficiency     Last assessed 14       Past Surgical History:   Procedure Laterality Date    CERVICAL BIOPSY  W/ LOOP ELECTRODE EXCISION      DILATION AND CURETTAGE OF UTERUS      GYNECOLOGIC CRYOSURGERY      cervix    HYSTERECTOMY      INDUCED   1998    STOMACH SURGERY      bypass    TONSILLECTOMY         Family History   Problem Relation Age of Onset    Heart failure Mother         Congestive    Coronary artery disease Mother     Hypertension Mother     Hypertension Father     Hypertension Sister     Diabetes Brother     Hypertension Brother     Anemia Family     Arthritis Family     Other Family         Blood transfusion    Cervical cancer Family     Cancer Family         Gastric    Ulcers Family     Uterine cancer Family      I have reviewed and agree with the history as documented.    E-Cigarette/Vaping    E-Cigarette Use Never User      E-Cigarette/Vaping Substances     Social History     Tobacco Use    Smoking status: Never    Smokeless tobacco: Never   Vaping Use    Vaping status: Never Used   Substance Use Topics    Alcohol use: Yes     Comment: social    Drug use: No        Review of Systems   Cardiovascular:  Positive for chest pain.   All other systems reviewed and are negative.      Physical Exam  ED Triage Vitals   Temperature Pulse Respirations Blood Pressure SpO2   24 0420 24 0420 24 0419 24 0420 24 042   97.9 °F (36.6 °C) 60 14 106/62 100 %      Temp Source Heart Rate Source Patient Position - Orthostatic VS BP Location FiO2 (%)   24 0420 24 0419  02/08/24 0444 02/08/24 0444 --   Oral Monitor Sitting Right arm       Pain Score       02/08/24 0419       9             Orthostatic Vital Signs  Vitals:    02/08/24 0444 02/08/24 0500 02/08/24 0600 02/08/24 0630   BP: 97/53 101/64 106/57 102/56   Pulse: 79 74 64 63   Patient Position - Orthostatic VS: Sitting Sitting Sitting Sitting       Physical Exam  Vitals and nursing note reviewed.   Constitutional:       General: She is not in acute distress.     Appearance: Normal appearance. She is well-developed and normal weight. She is not ill-appearing, toxic-appearing or diaphoretic.   HENT:      Head: Normocephalic and atraumatic.      Right Ear: External ear normal.      Left Ear: External ear normal.      Nose: Nose normal.      Mouth/Throat:      Mouth: Mucous membranes are moist.      Pharynx: Oropharynx is clear.   Eyes:      General: No scleral icterus.        Right eye: No discharge.         Left eye: No discharge.      Extraocular Movements: Extraocular movements intact.      Pupils: Pupils are equal, round, and reactive to light.   Neck:      Vascular: No JVD.      Trachea: No tracheal deviation.   Cardiovascular:      Rate and Rhythm: Normal rate and regular rhythm.      Pulses: Normal pulses.           Radial pulses are 2+ on the right side and 2+ on the left side.      Heart sounds: Normal heart sounds. Heart sounds not distant. No murmur heard.     No S3 or S4 sounds.   Pulmonary:      Effort: Pulmonary effort is normal. No tachypnea, accessory muscle usage or respiratory distress.      Breath sounds: Normal breath sounds. No stridor.   Chest:      Chest wall: No mass, deformity, tenderness, crepitus or edema.   Abdominal:      Palpations: Abdomen is soft. There is no hepatomegaly, splenomegaly or mass.      Tenderness: There is no abdominal tenderness. There is no guarding or rebound.   Musculoskeletal:         General: Normal range of motion.        Arms:       Cervical back: Normal range of motion and  neck supple.      Right lower leg: No tenderness. Edema present.      Left lower leg: No tenderness. Edema present.      Comments: 1+ bilateral pitting edema of the lower extremities.  Tense paraspinal muscles over right rhomboids/paraspinal region.   Skin:     General: Skin is warm and dry.      Capillary Refill: Capillary refill takes less than 2 seconds.      Coloration: Skin is not cyanotic, jaundiced or pale.      Findings: No bruising, ecchymosis, erythema, lesion, petechiae or rash.      Nails: There is no clubbing.   Neurological:      General: No focal deficit present.      Mental Status: She is alert and oriented to person, place, and time. Mental status is at baseline.   Psychiatric:         Mood and Affect: Mood normal.         Behavior: Behavior normal.         ED Medications  Medications   lidocaine (LIDODERM) 5 % patch 1 patch (1 patch Topical Medication Applied 2/8/24 0438)   sodium chloride 0.9 % bolus 1,000 mL (0 mL Intravenous Stopped 2/8/24 0615)   acetaminophen (TYLENOL) tablet 975 mg (975 mg Oral Given 2/8/24 0437)   morphine injection 4 mg (4 mg Intravenous Given 2/8/24 9065)   cyclobenzaprine (FLEXERIL) tablet 10 mg (10 mg Oral Given 2/8/24 0437)   aluminum-magnesium hydroxide-simethicone (MAALOX) oral suspension 30 mL (30 mL Oral Given 2/8/24 6310)   iohexol (OMNIPAQUE) 350 MG/ML injection (MULTI-DOSE) 100 mL (100 mL Intravenous Given 2/8/24 0528)       Diagnostic Studies  Results Reviewed       Procedure Component Value Units Date/Time    UA w Reflex to Microscopic w Reflex to Culture [[id number]] Collected: 02/08/24 0525    Lab Status: Final result Specimen: Urine, Clean Catch Updated: 02/08/24 0541     Color, UA Colorless     Clarity, UA Clear     Specific Gravity, UA 1.004     pH, UA 5.5     Leukocytes, UA Negative     Nitrite, UA Negative     Protein, UA Negative mg/dl      Glucose, UA Negative mg/dl      Ketones, UA Negative mg/dl      Urobilinogen, UA <2.0 mg/dl      Bilirubin, UA  Negative     Occult Blood, UA Negative    CK [[id number]]  (Normal) Collected: 02/08/24 0447    Lab Status: Final result Specimen: Blood from Arm, Left Updated: 02/08/24 0538     Total  U/L     HS Troponin 0hr (reflex protocol) [[id number]]  (Normal) Collected: 02/08/24 0447    Lab Status: Final result Specimen: Blood from Arm, Left Updated: 02/08/24 0535     hs TnI 0hr <2 ng/L     Comprehensive metabolic panel [[id number]] Collected: 02/08/24 0447    Lab Status: Final result Specimen: Blood from Arm, Left Updated: 02/08/24 0517     Sodium 139 mmol/L      Potassium 3.6 mmol/L      Chloride 108 mmol/L      CO2 26 mmol/L      ANION GAP 5 mmol/L      BUN 17 mg/dL      Creatinine 0.83 mg/dL      Glucose 83 mg/dL      Calcium 9.0 mg/dL      AST 22 U/L      ALT 16 U/L      Alkaline Phosphatase 76 U/L      Total Protein 6.5 g/dL      Albumin 4.1 g/dL      Total Bilirubin 0.78 mg/dL      eGFR 81 ml/min/1.73sq m     Narrative:      National Kid[employer] Foundation guidelines for Chronic Kidney Disease (CKD):     Stage 1 with normal or high GFR (GFR > 90 mL/min/1.73 square meters)    Stage 2 Mild CKD (GFR = 60-89 mL/min/1.73 square meters)    Stage 3A Moderate CKD (GFR = 45-59 mL/min/1.73 square meters)    Stage 3B Moderate CKD (GFR = 30-44 mL/min/1.73 square meters)    Stage 4 Severe CKD (GFR = 15-29 mL/min/1.73 square meters)    Stage 5 End Stage CKD (GFR <15 mL/min/1.73 square meters)  Note: GFR calculation is accurate only with a steady state creatinine    Lipase [[id number]]  (Normal) Collected: 02/08/24 0447    Lab Status: Final result Specimen: Blood from Arm, Left Updated: 02/08/24 0517     Lipase 31 u/L     CBC and differential [[id number]]  (Abnormal) Collected: 02/08/24 0447    Lab Status: Final result Specimen: Blood from Arm, Left Updated: 02/08/24 0500     WBC 4.67 Thousand/uL      RBC 4.10 Million/uL      Hemoglobin 12.9 g/dL      Hematocrit 39.9 %      MCV 97 fL      MCH 31.5 pg      MCHC 32.3 g/dL      RDW  12.6 %      MPV 10.0 fL      Platelets 188 Thousands/uL      nRBC 0 /100 WBCs      Neutrophils Relative 46 %      Immat GRANS % 0 %      Lymphocytes Relative 37 %      Monocytes Relative 9 %      Eosinophils Relative 7 %      Basophils Relative 1 %      Neutrophils Absolute 2.12 Thousands/µL      Immature Grans Absolute 0.01 Thousand/uL      Lymphocytes Absolute 1.73 Thousands/µL      Monocytes Absolute 0.43 Thousand/µL      Eosinophils Absolute 0.33 Thousand/µL      Basophils Absolute 0.05 Thousands/µL                    CTA dissection protocol chest abdomen pelvis w wo contrast   Final Result by Hilaria Peguero MD (02/08 0651)      No aortic dissection or intramural hematoma. No aortic aneurysm.      Prior bariatric surgery. No bowel obstruction. Normal appendix.      Scoliosis.      Other nonemergent findings as above.               Workstation performed: [id number]         XR chest 1 view portable   ED Interpretation by Marquise Hendrix MD (02/08 0454)   No acute cardiopulmonary process noted.  Scoliosis.            Procedures  Procedures      ED Course  ED Course as of 02/08/24 0730   Thu Feb 08, 2024 0458 EKG normal sinus rhythm rate of 78, normal FL interval, normal QRS interval, QTc 442, normal axis, no ST elevations, no ST depressions, no ischemic changes or STEMI, when compared to previous EKG on November 11, 2015 no significant changes noted.             HEART Risk Score      Flowsheet Row Most Recent Value   Heart Score Risk Calculator    History 0 Filed at: 02/08/2024 0706   ECG 0 Filed at: 02/08/2024 0706   Age 1 Filed at: 02/08/2024 0706   Risk Factors 1 Filed at: 02/08/2024 0706   Troponin 0 Filed at: 02/08/2024 0706   HEART Score 2 Filed at: 02/08/2024 0706                        SBIRT 22yo+      Flowsheet Row Most Recent Value   Initial Alcohol Screen: US AUDIT-C     1. How often do you have a drink containing alcohol? 1 Filed at: 02/08/2024 0455   2. How many drinks containing alcohol do  you have on a typical day you are drinking?  1 Filed at: 02/08/2024 0455   3b. FEMALE Any Age, or MALE 65+: How often do you have 4 or more drinks on one occassion? 0 Filed at: 02/08/2024 0455   Audit-C Score 2 Filed at: 02/08/2024 0455   JULIETTE: How many times in the past year have you...    Used an illegal drug or used a prescription medication for non-medical reasons? Never Filed at: 02/08/2024 0455                  Medical Decision Making  53-year-old female presents with chest pain and back pain.  Differential includes but not limited to ACS, dissection, pneumonia, muscle spasm, cholecystitis, cholelithiasis, GERD, bloating, gastritis, cystitis    Labs and imaging within normal limits.  No acute findings on chest x-ray.  EKG within normal limits.  Heart score 2.    Patient is overall well-appearing in no acute distress, normal cardiopulmonary exam, reproducible musculoskeletal pain, abdomen soft nontender without peritoneal signs.  Discussed findings with patient.  Prescription for Maalox and Carafate given.  Advised to continue omeprazole.  Activity as tolerated.  Tylenol and ibuprofen for pain.  May attempt warm or cold compresses.  Follow-up with comprehensive spine.  Patient understanding and agreement with plan.  Patient discharged home to self-care with strict return precautions.    Amount and/or Complexity of Data Reviewed  Labs: ordered.  Radiology: ordered and independent interpretation performed.    Risk  OTC drugs.  Prescription drug management.          Disposition  Final diagnoses:   Chest pain   Back pain   Scoliosis     Time reflects when diagnosis was documented in both MDM as applicable and the Disposition within this note       Time User Action Codes Description Comment    2/8/2024  4:55 AM Marquise Hendrix Add [R07.9] Chest pain     2/8/2024  4:55 AM Marquise Hendrix Add [M54.9] Back pain     2/8/2024  4:55 AM Marquise Hendrix Add [M41.9] Scoliosis           ED Disposition       ED Disposition    Discharge    Condition   Stable    Date/Time   Thu Feb 8, 2024 0704    Comment   Lina Johnson discharge to home/self care.                   Follow-up Information       Follow up With Specialties Details Why Contact Info Additional Information    Malina Jones MD Internal Medicine Schedule an appointment as soon as possible for a visit in 1 week  [address] [place] [id number]  [phone]       [facility] Emergency Department Emergency Medicine Go to  If symptoms worsen [address] [facility] [address]  [phone] [facility] Emergency Department, [facility] [place], [place], [address]            Patient's Medications   Discharge Prescriptions    ALUMINUM-MAGNESIUM HYDROXIDE 200-200 MG/5ML SUSPENSION    Take 10 mL by mouth every 6 (six) hours as needed for heartburn for up to 9 days       Start Date: 2/8/2024  End Date: 2/17/2024       Order Dose: 10 mL       Quantity: 355 mL    Refills: 0    SUCRALFATE (CARAFATE) 1 G TABLET    Take 1 tablet (1 g total) by mouth 4 (four) times a day for 7 days       Start Date: 2/8/2024  End Date: 2/15/2024       Order Dose: 1 g       Quantity: 28 tablet    Refills: 0         PDMP Review         Value Time User    PDMP Reviewed  Yes 2/8/2024  4:16 AM Dane Mreaz MD             ED Provider  Attending physically available and evaluated Lina Johnson. I managed the patient along with the ED Attending.    Electronically Signed by           Marquise Hendrix MD  02/08/24 5496

## 2024-02-09 ENCOUNTER — TELEPHONE (OUTPATIENT)
Dept: PHYSICAL THERAPY | Facility: OTHER | Age: 54
End: 2024-02-09

## 2024-02-15 NOTE — TELEPHONE ENCOUNTER
Call placed to the patient per [facility] referral.     V/M left for patient to call back. Phone number and hours of business provided.      This is the 2nd attempt to reach the patient. Will defer per protocol.

## 2024-05-24 ENCOUNTER — OFFICE VISIT (OUTPATIENT)
Dept: FAMILY MEDICINE CLINIC | Facility: CLINIC | Age: 54
End: 2024-05-24
Payer: COMMERCIAL

## 2024-05-24 VITALS
BODY MASS INDEX: 30.61 KG/M2 | TEMPERATURE: 97.3 F | HEIGHT: 67 IN | HEART RATE: 84 BPM | SYSTOLIC BLOOD PRESSURE: 100 MMHG | RESPIRATION RATE: 16 BRPM | WEIGHT: 195 LBS | DIASTOLIC BLOOD PRESSURE: 74 MMHG

## 2024-05-24 DIAGNOSIS — D32.9 MENINGIOMA (HCC): ICD-10-CM

## 2024-05-24 DIAGNOSIS — F41.9 ANXIETY: ICD-10-CM

## 2024-05-24 DIAGNOSIS — M32.9 SYSTEMIC LUPUS ERYTHEMATOSUS, UNSPECIFIED SLE TYPE, UNSPECIFIED ORGAN INVOLVEMENT STATUS (HCC): ICD-10-CM

## 2024-05-24 DIAGNOSIS — Z00.00 ROUTINE ADULT HEALTH MAINTENANCE: Primary | ICD-10-CM

## 2024-05-24 DIAGNOSIS — Z11.59 NEED FOR HEPATITIS C SCREENING TEST: ICD-10-CM

## 2024-05-24 DIAGNOSIS — E55.9 VITAMIN D DEFICIENCY: ICD-10-CM

## 2024-05-24 DIAGNOSIS — Z13.29 SCREENING FOR THYROID DISORDER: ICD-10-CM

## 2024-05-24 DIAGNOSIS — Z13.6 SCREENING FOR CARDIOVASCULAR CONDITION: ICD-10-CM

## 2024-05-24 DIAGNOSIS — F32.A MILD DEPRESSION: ICD-10-CM

## 2024-05-24 DIAGNOSIS — Z23 NEED FOR VACCINATION: ICD-10-CM

## 2024-05-24 PROBLEM — Z98.84 S/P GASTRIC BYPASS: Status: ACTIVE | Noted: 2024-05-24

## 2024-05-24 PROCEDURE — 99386 PREV VISIT NEW AGE 40-64: CPT | Performed by: NURSE PRACTITIONER

## 2024-05-24 PROCEDURE — 90471 IMMUNIZATION ADMIN: CPT

## 2024-05-24 PROCEDURE — 90750 HZV VACC RECOMBINANT IM: CPT

## 2024-05-24 PROCEDURE — 90715 TDAP VACCINE 7 YRS/> IM: CPT

## 2024-05-24 PROCEDURE — 90472 IMMUNIZATION ADMIN EACH ADD: CPT

## 2024-05-24 RX ORDER — METHOCARBAMOL 500 MG/1
500 TABLET, FILM COATED ORAL 3 TIMES DAILY PRN
COMMUNITY
Start: 2024-03-15

## 2024-05-24 RX ORDER — BUPROPION HYDROCHLORIDE 75 MG/1
75 TABLET ORAL 2 TIMES DAILY
Qty: 180 TABLET | Refills: 0 | Status: SHIPPED | OUTPATIENT
Start: 2024-05-24 | End: 2024-08-22

## 2024-05-24 RX ORDER — HYDROXYZINE HYDROCHLORIDE 25 MG/1
25 TABLET, FILM COATED ORAL 2 TIMES DAILY PRN
Qty: 180 TABLET | Refills: 0 | Status: SHIPPED | OUTPATIENT
Start: 2024-05-24 | End: 2024-08-22

## 2024-05-24 NOTE — PROGRESS NOTES
FAMILY [facility] HEALTH MAINTENANCE OFFICE VISIT  [facility]    NAME: Lina Johnson  AGE: 53 y.o. SEX: female  : 1970     DATE: 2024    Assessment and Plan     1. Routine adult health maintenance  2. Systemic lupus erythematosus, unspecified SLE type, unspecified organ involvement status (HCC)  Assessment & Plan:  Managed by Rheumatologist at [facility]  Orders:  -     Ambulatory referral to Rheumatology; Future  3. Meningioma (HCC)  Assessment & Plan:  Managed by neurologist at [facility]  4. Need for hepatitis C screening test  -     Hepatitis C Ab W/Refl To HCV RNA, Qn, PCR; Future  -     Hepatitis C Ab W/Refl To HCV RNA, Qn, PCR  5. Screening for thyroid disorder  -     TSH, 3rd generation with Free T4 reflex; Future  -     TSH, 3rd generation with Free T4 reflex  6. Screening for cardiovascular condition  -     Lipid Panel with Direct LDL reflex; Future  -     Lipid Panel with Direct LDL reflex  7. Vitamin D deficiency  -     Vitamin D 25 hydroxy; Future  -     Vitamin D 25 hydroxy  8. Need for vaccination  -     TDAP VACCINE GREATER THAN OR EQUAL TO 8YO IM  -     Zoster Vaccine Recombinant IM  9. Mild depression  -     buPROPion (WELLBUTRIN) 75 mg tablet; Take 1 tablet (75 mg total) by mouth 2 (two) times a day  10. Anxiety  -     hydrOXYzine HCL (ATARAX) 25 mg tablet; Take 1 tablet (25 mg total) by mouth 2 (two) times a day as needed for anxiety      Patient Counseling:   Nutrition: Stressed importance of a well balanced diet, moderation of sodium/saturated fat, caloric balance and sufficient intake of fiber  Exercise: Stressed the importance of regular exercise with a goal of 150 minutes per week  Dental Health: Discussed daily flossing and brushing and regular dental visits   Sexuality: Discussed sexually transmitted infections, use of condoms and prevention of unintended pregnancy  Alcohol Use:  Recommended moderation of alcohol intake  Injury Prevention: Discussed  Safety Belts, Safety Helmets, and Smoke Detectors    Immunizations reviewed: See Orders  Discussed benefits of:  Colon Cancer Screening, Mammogram , Cervical Cancer screening, and Screening labs.  BMI Counseling: Body mass index is 30.54 kg/m². Discussed with patient's BMI with her. The BMI is above normal. Nutrition recommendations include reducing portion sizes, decreasing overall calorie intake, 3-5 servings of fruits/vegetables daily, reducing fast food intake, consuming healthier snacks, decreasing soda and/or juice intake, moderation in carbohydrate intake, increasing intake of lean protein, reducing intake of saturated fat and trans fat, and reducing intake of cholesterol. Exercise recommendations include exercising 3-5 times per week, joining a gym, and strength training exercises.    Return in about 2 months (around 8/5/2024).        Chief Complaint     Chief Complaint   Patient presents with   • Establish Care     Lindsey WEAVER   • Well Check       History of Present Illness     HPI  New to practice.  Personal and family medical history reviewed.     Well Adult Physical   Patient here for a comprehensive physical exam.  Patient under care of gynecologist and uptodate on pap, mammogram and colonoscopy and will get results.  Stated that under lot of stress and feeling down and depressed and anxious at times and not able to sleep well. Will start on wellbutrin and will take hydroxyzine for anxiety and insomnia as needed.  Needs forms done for insurance and blood work ordered.          Diet and Physical Activity  Diet: well balanced diet  Exercise: never      Depression Screen  PHQ-2/9 Depression Screening    Little interest or pleasure in doing things: 0 - not at all  Feeling down, depressed, or hopeless: 1 - several days  PHQ-2 Score: 1  PHQ-2 Interpretation: Negative depression screen          General Health  Hearing: Normal:  bilateral  Vision: most recent eye exam <1 year and wears glasses  Dental: regular  dental visits    Reproductive Health  Follows with gynecologist      The following portions of the patient's history were reviewed and updated as appropriate: allergies, current medications, past family history, past medical history, past social history, past surgical history and problem list.    Review of Systems     Review of Systems   Constitutional:  Positive for fatigue. Negative for activity change, appetite change, chills, diaphoresis, fever and unexpected weight change.   HENT: Negative.     Eyes: Negative.    Respiratory: Negative.     Cardiovascular: Negative.    Gastrointestinal: Negative.    Endocrine: Negative.    Genitourinary: Negative.    Musculoskeletal: Negative.    Skin: Negative.    Allergic/Immunologic: Negative.    Neurological: Negative.    Hematological: Negative.    Psychiatric/Behavioral:  Positive for sleep disturbance. Negative for self-injury and suicidal ideas. The patient is nervous/anxious.         As noted in HPI             Past Medical History     Past Medical History:   Diagnosis Date   • Condyloma acuminata 2012   • Obesity     Last assessed 13- morbid   • SLE (systemic lupus erythematosus) (HCC)    • Vitamin D deficiency     Last assessed 14       Past Surgical History     Past Surgical History:   Procedure Laterality Date   • CERVICAL BIOPSY  W/ LOOP ELECTRODE EXCISION     • DILATION AND CURETTAGE OF UTERUS     • GYNECOLOGIC CRYOSURGERY      cervix   • HYSTERECTOMY     • INDUCED   1998   • STOMACH SURGERY      bypass   • TONSILLECTOMY         Social History     Social History     Socioeconomic History   • Marital status: /Civil Union     Spouse name: None   • Number of children: None   • Years of education: None   • Highest education level: None   Occupational History   • Occupation: Teacher   Tobacco Use   • Smoking status: Never     Passive exposure: Never   • Smokeless tobacco: Never   Vaping Use   • Vaping status: Never Used    Substance and Sexual Activity   • Alcohol use: Yes     Comment: social   • Drug use: No   • Sexual activity: Yes     Partners: Male     Birth control/protection: Male Sterilization   Other Topics Concern   • None   Social History Narrative    Daily coffee consumption        Daily cola consumption     Social Determinants of Health     Financial Resource Strain: Not on file   Food Insecurity: Not on file   Transportation Needs: Not on file   Physical Activity: Not on file   Stress: Not on file   Social Connections: Not on file   Intimate Partner Violence: Not on file   Housing Stability: Not on file       Family History     Family History   Problem Relation Age of Onset   • Heart failure Mother         Congestive   • Coronary artery disease Mother    • Hypertension Mother    • Hypertension Father    • Hypertension Sister    • Diabetes Brother    • Hypertension Brother    • Anemia Family    • Arthritis Family    • Other Family         Blood transfusion   • Cervical cancer Family    • Cancer Family         Gastric   • Ulcers Family    • Uterine cancer Family        Current Medications       Current Outpatient Medications:   •  buPROPion (WELLBUTRIN) 75 mg tablet, Take 1 tablet (75 mg total) by mouth 2 (two) times a day, Disp: 180 tablet, Rfl: 0  •  Calcium Ascorbate 500 MG TABS, Take 500 mg by mouth daily, Disp: , Rfl:   •  Cholecalciferol (VITAMIN D3) 1000 units CAPS, Take by mouth in the morning, Disp: , Rfl:   •  cyclobenzaprine (FLEXERIL) 10 mg tablet, Take 1 tablet (10 mg total) by mouth 2 (two) times a day as needed for muscle spasms, Disp: 20 tablet, Rfl: 0  •  doxepin (SINEquan) 25 mg capsule, Take 5 mg by mouth daily at bedtime, Disp: , Rfl:   •  hydroxychloroquine (PLAQUENIL) 200 mg tablet, Take 200 mg by mouth 2 (two) times a day, Disp: , Rfl:   •  hydrOXYzine HCL (ATARAX) 25 mg tablet, Take 1 tablet (25 mg total) by mouth 2 (two) times a day as needed for anxiety, Disp: 180 tablet, Rfl: 0  •  methocarbamol  "(ROBAXIN) 500 mg tablet, Take 500 mg by mouth Three times daily as needed, Disp: , Rfl:   •  Multiple Vitamin (MULTI-VITAMIN DAILY) TABS, Take 1 tablet by mouth daily, Disp: , Rfl:   •  omeprazole (PriLOSEC) 20 mg delayed release capsule, Take 20 mg by mouth daily, Disp: , Rfl:   •  IRON PO, Take by mouth (Patient not taking: Reported on 1/17/2023), Disp: , Rfl:      Allergies     Allergies   Allergen Reactions   • Nsaids        Objective     /74   Pulse 84   Temp (!) 97.3 °F (36.3 °C)   Resp 16   Ht 5' 7\" (1.702 m)   Wt 88.5 kg (195 lb)   LMP 04/05/2018   BMI 30.54 kg/m²      Physical Exam  Vitals reviewed.   Constitutional:       Appearance: She is obese.   HENT:      Head: Normocephalic and atraumatic.      Salivary Glands: Right salivary gland is not tender. Left salivary gland is not tender.      Right Ear: Tympanic membrane, ear canal and external ear normal.      Left Ear: Tympanic membrane, ear canal and external ear normal. There is no impacted cerumen.      Nose: Nose normal. No congestion.      Mouth/Throat:      Mouth: Mucous membranes are moist.      Pharynx: No oropharyngeal exudate or posterior oropharyngeal erythema.   Eyes:      General: Lids are normal.         Right eye: No discharge or hordeolum.         Left eye: No discharge or hordeolum.      Conjunctiva/sclera: Conjunctivae normal.      Right eye: Right conjunctiva is not injected. No exudate or hemorrhage.     Left eye: Left conjunctiva is not injected. No exudate or hemorrhage.     Pupils: Pupils are equal, round, and reactive to light.   Neck:      Thyroid: No thyromegaly or thyroid tenderness.   Cardiovascular:      Rate and Rhythm: Normal rate and regular rhythm.      Pulses: Normal pulses.      Heart sounds: Normal heart sounds.   Pulmonary:      Effort: Pulmonary effort is normal.      Breath sounds: Normal breath sounds.   Chest:      Chest wall: No deformity, swelling, tenderness, crepitus or edema. There is no dullness " to percussion.   Abdominal:      General: Abdomen is flat. Bowel sounds are normal.      Palpations: Abdomen is soft.      Tenderness: There is no abdominal tenderness.      Hernia: There is no hernia in the umbilical area, ventral area, left inguinal area or right inguinal area.   Genitourinary:     Comments:  and breast exam deferred as follows with gynecologist   Musculoskeletal:         General: No swelling or tenderness. Normal range of motion.      Cervical back: Full passive range of motion without pain, normal range of motion and neck supple.      Right lower leg: No edema.      Left lower leg: No edema.   Lymphadenopathy:      Cervical:      Right cervical: No superficial or posterior cervical adenopathy.     Left cervical: No superficial or posterior cervical adenopathy.      Upper Body:      Right upper body: No supraclavicular or axillary adenopathy.      Left upper body: No supraclavicular or axillary adenopathy.      Lower Body: No right inguinal adenopathy. No left inguinal adenopathy.   Skin:     General: Skin is warm and dry.      Findings: No rash.   Neurological:      General: No focal deficit present.      Mental Status: She is alert and oriented to person, place, and time. Mental status is at baseline.      GCS: GCS eye subscore is 4. GCS verbal subscore is 5. GCS motor subscore is 6.      Motor: Motor function is intact.      Coordination: Coordination is intact. Coordination normal.      Gait: Gait normal.      Deep Tendon Reflexes: Reflexes are normal and symmetric.   Psychiatric:         Attention and Perception: Attention normal.         Mood and Affect: Mood normal.         Speech: Speech normal.         Behavior: Behavior normal. Behavior is cooperative.         Thought Content: Thought content normal.         Judgment: Judgment normal.           Vision Screening - Comments:: Unable to read the eye chart since she doesn't have her glasses with her today Lindsey Fernandez  [facility]

## 2024-05-24 NOTE — PATIENT INSTRUCTIONS
Hydroxyzine (By mouth)   Hydroxyzine (onr-MAVK-x-[id number])  Treats anxiety, nausea, vomiting, allergies, skin rash, hives, and itching. May also be used with anesthesia for medical procedures.   Brand Name(s): Vistaril   There may be other brand names for this medicine.  When This Medicine Should Not Be Used:   This medicine is not right for everyone. Do not use it if you had an allergic reaction to hydroxyzine, cetirizine, or levocetirizine. Do not use it during the early part of pregnancy or if you have heart rhythm problems (including prolonged QT interval).  How to Use This Medicine:   Capsule, Liquid, Tablet  Your doctor will tell you how much medicine to use. Do not use more than directed.  Oral liquid: Measure the oral liquid medicine with a marked measuring spoon, oral syringe, or medicine cup. Shake well just before use.  Missed dose: Take a dose as soon as you remember. If it is almost time for your next dose, wait until then and take a regular dose. Do not take extra medicine to make up for a missed dose.  Store the medicine in a closed container at room temperature, away from heat, moisture, and direct light.  Drugs and Foods to Avoid:   Ask your doctor or pharmacist before using any other medicine, including over-the-counter medicines, vitamins, and herbal products.  Some medicines can affect how hydroxyzine works. Tell your doctor if you are using any of the following:  Droperidol, methadone, ondansetron, pentamidine  Antibiotic (including azithromycin, clarithromycin, erythromycin, gatifloxacin, moxifloxacin)  Medicine to treat depression (including citalopram, fluoxetine)  Medicine to treat heart rhythm problems (including amiodarone, procainamide, quinidine, sotalol)  Medicine to treat mental health problems (including chlorpromazine, clozapine, iloperidone, quetiapine, ziprasidone)  Keep all medicine out of the reach of children. Never share your medicine with anyone.  Warnings While Using This  Medicine:   Tell your doctor if you are pregnant or breastfeeding, or if you have heart disease, heart failure, a slow heartbeat, skin problems, or had a recent heart attack.  This medicine may cause the following problems:  Heart rhythm problems, including QT prolongation  Serious skin reaction, including acute generalized exanthematous pustulosis (AGEP)  This medicine may make you drowsy. Do not drive or do anything that could be dangerous until you know how this medicine affects you.  Call your doctor if your symptoms do not improve or if they get worse.  Keep all medicine out of the reach of children. Never share your medicine with anyone.  Possible Side Effects While Using This Medicine:   Call your doctor right away if you notice any of these side effects:  Allergic reaction: Itching or hives, swelling in your face or hands, swelling or tingling in your mouth or throat, chest tightness, trouble breathing  Fainting, dizziness, lightheadedness  Fast, pounding, or uneven heartbeat  Fever, skin rash  Severe tiredness  If you notice these less serious side effects, talk with your doctor:   Drowsiness, sleepiness  Dry mouth  If you notice other side effects that you think are caused by this medicine, tell your doctor.   Call your doctor for medical advice about side effects. You may report side effects to FDA at 2-038-FDA-0006  © Copyright [employer] 2023 Information is for End User's use only and may not be sold, redistributed or otherwise used for commercial purposes.  The above information is an  only. It is not intended as medical advice for individual conditions or treatments. Talk to your doctor, nurse or pharmacist before following any medical regimen to see if it is safe and effective for you.  Bupropion (By mouth)   Bupropion (xoi-TJBI-[id number]-on)  Treats depression and aids in quitting smoking. Also prevents depression caused by seasonal affective disorder (SAD).   Brand Name(s): Aplelvia Forfivo  XL, Wellbutrin, Wellbutrin SR, Wellbutrin XL   There may be other brand names for this medicine.  When This Medicine Should Not Be Used:   This medicine is not right for everyone. Do not use it if you had an allergic reaction to bupropion, or if you have seizures, anorexia, or bulimia.  How to Use This Medicine:   Tablet, Long Acting Tablet  Take your medicine as directed. Your dose may need to be changed several times to find what works best for you.  You may need to take Wellbutrin® for up to 4 weeks before you feel better. You may need to take Zyban® for 1 to 2 weeks before the date that you plan to stop smoking.  Swallow the extended-release tablet whole. Do not crush, break, or chew it.  It is best to take Aplenzin® in the morning.  Do not take Wellbutrin® or Zyban® close to bedtime if you have trouble sleeping.  Take it with food if it upsets your stomach or if you have nausea.  If you take the extended-release tablet, part of the tablet may pass into your stools. This is normal and is nothing to worry about.  This medicine should come with a Medication Guide. Ask your pharmacist for a copy if you do not have one.  Missed dose: Skip the missed dose and go back to your regular dosing schedule. Never take extra medicine to make up for a missed dose.  Store the medicine in a closed container at room temperature, away from heat, moisture, and direct light.  Drugs and Foods to Avoid:   Ask your doctor or pharmacist before using any other medicine, including over-the-counter medicines, vitamins, and herbal products.  Do not use this medicine and an MAO inhibitor (MAOI) within 14 days of each other. Do not use Zyban® to quit smoking if you already take Aplenzin® or Wellbutrin® for depression, because they are the same medicine.  Tell your doctor if you take barbiturates, benzodiazepines, antiseizure medicine, or sedatives, or if you recently stopped taking them.  Some medicines can affect how bupropion works. Tell  your doctor if you use any of the following:   Amantadine, carbamazepine, cimetidine, clopidogrel, cyclophosphamide, digoxin, efavirenz, levodopa, lopinavir, nelfinavir, nicotine patch, orphenadrine, phenobarbital, phenytoin, ritonavir, tamoxifen, theophylline, thiotepa, ticlopidine  Beta blocker medicine (including metoprolol)  A blood thinner (including warfarin)  Insulin or diabetes medicine  Medicine to treat depression (including desipramine, fluoxetine, imipramine, nortriptyline, paroxetine, sertraline, venlafaxine)  Medicine to treat heart rhythm problems (including flecainide, propafenone)  Medicine to treat mental illness (including haloperidol, risperidone, thioridazine)  Steroid medicine (including dexamethasone, hydrocortisone, methylprednisolone, prednisolone, prednisone)  Do not drink alcohol while you are using this medicine.  Tell your doctor if you use anything else that makes you sleepy. Some examples are allergy medicine, narcotic pain medicine, and alcohol.  Warnings While Using This Medicine:   Tell your doctor if you are pregnant or breastfeeding, or if you have kidney disease, liver disease, heart disease, diabetes, glaucoma, mental illness (including bipolar disorder), or high blood pressure. Tell your doctor if you have a history of drug addiction or if you drink alcohol.  For some children, teenagers, and young adults, this medicine may increase mental or emotional problems. This may lead to thoughts of suicide and violence. Talk with your doctor right away if you have any thoughts or behavior changes that concern you. Tell your doctor if you or anyone in your family has a history of bipolar disorder or suicide attempts.  This medicine may cause the following problems:  Increased risk of seizures  Changes in mood or behavior  High blood pressure  Serious skin reactions  This medicine may make you dizzy or drowsy. Do not drive or do anything that could be dangerous until you know how this  medicine affects you.  Zyban® is only part of a complete program to help you quit smoking. You may still want to smoke at times. Have a plan to cope with these situations.  Do not stop using this medicine suddenly. Your doctor will need to slowly decrease your dose before you stop it completely.  Tell any doctor or dentist who treats you that you are using this medicine. This medicine may affect certain medical test results.  Your doctor will check your progress and the effects of this medicine at regular visits. Keep all appointments.  Keep all medicine out of the reach of children. Never share your medicine with anyone.  Possible Side Effects While Using This Medicine:   Call your doctor right away if you notice any of these side effects:  Allergic reaction: Itching or hives, swelling in your face or hands, swelling or tingling in your mouth or throat, chest tightness, trouble breathing  Blistering, peeling, red skin rash  Chest pain, trouble breathing, fast, slow, or uneven heartbeat  Eye pain, vision changes, seeing halos around lights  Muscle or joint pain, fever with rash  Seeing or hearing things that are not there, feeling like people are against you  Seizures  Sudden increase in energy, racing thoughts, trouble sleeping  Thoughts of hurting yourself, worsening depression, severe agitation or confusion  If you notice these less serious side effects, talk with your doctor:   Dry mouth  Headache, dizziness  Nausea, vomiting, constipation, diarrhea, gas, stomach pain  Weight gain or loss  If you notice other side effects that you think are caused by this medicine, tell your doctor.   Call your doctor for medical advice about side effects. You may report side effects to FDA at 4-132-DHK-9901  © Copyright [employer] 2023 Information is for End User's use only and may not be sold, redistributed or otherwise used for commercial purposes.  The above information is an  only. It is not intended as medical  advice for individual conditions or treatments. Talk to your doctor, nurse or pharmacist before following any medical regimen to see if it is safe and effective for you.  Wellness Visit for Adults   AMBULATORY CARE:   A wellness visit  is when you see your healthcare provider to get screened for health problems. Your healthcare provider will also give you advice on how to stay healthy. Write down your questions so you remember to ask them. Ask your healthcare provider how often you should have a wellness visit.  What happens at a wellness visit:  Your healthcare provider will ask about your health, and your family history of health problems. This includes high blood pressure, heart disease, and cancer. He or she will ask if you have symptoms that concern you, if you smoke, and about your mood. You may also be asked about your intake of medicines, supplements, food, and alcohol. Any of the following may be done:  Your weight  will be checked. Your height may also be checked so your body mass index (BMI) can be calculated. Your BMI shows if you are at a healthy weight.    Your blood pressure  and heart rate will be checked. Your temperature may also be checked.    Blood and urine tests  may be done. Blood tests may be done to check your cholesterol levels. Abnormal cholesterol levels increase your risk for heart disease and stroke. You may also need a blood or urine test to check for diabetes if you are at increased risk. Urine tests may be done to look for signs of an infection or kidney disease.    A physical exam  includes checking your heartbeat and lungs with a stethoscope. Your healthcare provider may also check your skin to look for sun damage.    Screening tests  may be recommended. A screening test is done to check for diseases that may not cause symptoms. The screening tests you may need depend on your age, gender, family history, and lifestyle habits. For example, colorectal screening may be recommended if  you are 50 years old or older.    Screening tests you need if you are a woman:   A Pap smear  is used to screen for cervical cancer. Pap smears are usually done every 3 to 5 years depending on your age. You may need them more often if you have had abnormal Pap smear test results in the past. Ask your healthcare provider how often you should have a Pap smear.    A mammogram  is an x-ray of your breasts to screen for breast cancer. Experts recommend mammograms every 2 years starting at age 50 years. You may need a mammogram at age 49 years or younger if you have an increased risk for breast cancer. Talk to your healthcare provider about when you should start having mammograms and how often you need them.    Vaccines you may need:   Get an influenza vaccine  every year. The influenza vaccine protects you from the flu. Several types of viruses cause the flu. The viruses change over time, so new vaccines are made each year.    Get a tetanus-diphtheria (Td) booster vaccine  every 10 years. This vaccine protects you against tetanus and diphtheria. Tetanus is a severe infection that may cause painful muscle spasms and lockjaw. Diphtheria is a severe bacterial infection that causes a thick covering in the back of your mouth and throat.    Get a human papillomavirus (HPV) vaccine  if you are female and aged 19 to 26 or male 19 to 21 and never received it. This vaccine protects you from HPV infection. HPV is the most common infection spread by sexual contact. HPV may also cause vaginal, penile, and anal cancers.    Get a pneumococcal vaccine  if you are aged 65 years or older. The pneumococcal vaccine is an injection given to protect you from pneumococcal disease. Pneumococcal disease is an infection caused by pneumococcal bacteria. The infection may cause pneumonia, meningitis, or an ear infection.    Get a shingles vaccine  if you are 60 or older, even if you have had shingles before. The shingles vaccine is an injection to  protect you from the varicella-zoster virus. This is the same virus that causes chickenpox. Shingles is a painful rash that develops in people who had chickenpox or have been exposed to the virus.    How to eat healthy:  My Plate is a model for planning healthy meals. It shows the types and amounts of foods that should go on your plate. Fruits and vegetables make up about half of your plate, and grains and protein make up the other half. A serving of dairy is included on the side of your plate. The amount of calories and serving sizes you need depends on your age, gender, weight, and height. Examples of healthy foods are listed below:  Eat a variety of vegetables  such as dark green, red, and orange vegetables. You can also include canned vegetables low in sodium (salt) and frozen vegetables without added butter or sauces.    Eat a variety of fresh fruits , canned fruit in 100% juice, frozen fruit, and dried fruit.    Include whole grains.  At least half of the grains you eat should be whole grains. Examples include whole-wheat bread, wheat pasta, brown rice, and whole-grain cereals such as oatmeal.    Eat a variety of protein foods such as seafood (fish and shellfish), lean meat, and poultry without skin (turkey and chicken). Examples of lean meats include pork leg, shoulder, or tenderloin, and beef round, sirloin, tenderloin, and extra lean ground beef. Other protein foods include eggs and egg substitutes, beans, peas, soy products, nuts, and seeds.    Choose low-fat dairy products such as skim or 1% milk or low-fat yogurt, cheese, and cottage cheese.    Limit unhealthy fats  such as butter, hard margarine, and shortening.       Exercise:  Exercise at least 30 minutes per day on most days of the week. Some examples of exercise include walking, biking, dancing, and swimming. You can also fit in more physical activity by taking the stairs instead of the elevator or parking farther away from stores. Include muscle  strengthening activities 2 days each week. Regular exercise provides many health benefits. It helps you manage your weight, and decreases your risk for type 2 diabetes, heart disease, stroke, and high blood pressure. Exercise can also help improve your mood. Ask your healthcare provider about the best exercise plan for you.       General health and safety guidelines:   Do not smoke.  Nicotine and other chemicals in cigarettes and cigars can cause lung damage. Ask your healthcare provider for information if you currently smoke and need help to quit. E-cigarettes or smokeless tobacco still contain nicotine. Talk to your healthcare provider before you use these products.    Limit alcohol.  A drink of alcohol is 12 ounces of beer, 5 ounces of wine, or 1½ ounces of liquor.    Lose weight, if needed.  Being overweight increases your risk of certain health conditions. These include heart disease, high blood pressure, type 2 diabetes, and certain types of cancer.    Protect your skin.  Do not sunbathe or use tanning beds. Use sunscreen with a SPF 15 or higher. Apply sunscreen at least 15 minutes before you go outside. Reapply sunscreen every 2 hours. Wear protective clothing, hats, and sunglasses when you are outside.    Drive safely.  Always wear your seatbelt. Make sure everyone in your car wears a seatbelt. A seatbelt can save your life if you are in an accident. Do not use your cell phone when you are driving. This could distract you and cause an accident. Pull over if you need to make a call or send a text message.    Practice safe sex.  Use latex condoms if are sexually active and have more than one partner. Your healthcare provider may recommend screening tests for sexually transmitted infections (STIs).    Wear helmets, lifejackets, and protective gear.  Always wear a helmet when you ride a bike or motorcycle, go skiing, or play sports that could cause a head injury. Wear protective equipment when you play sports.  Wear a lifejacket when you are on a boat or doing water sports.    © Copyright [employer] 2023 Information is for End User's use only and may not be sold, redistributed or otherwise used for commercial purposes.  The above information is an  only. It is not intended as medical advice for individual conditions or treatments. Talk to your doctor, nurse or pharmacist before following any medical regimen to see if it is safe and effective for you.

## 2024-06-04 ENCOUNTER — VBI (OUTPATIENT)
Dept: ADMINISTRATIVE | Facility: OTHER | Age: 54
End: 2024-06-04

## 2024-06-16 LAB
25(OH)D3 SERPL-MCNC: 33 NG/ML (ref 30–100)
CHOLEST SERPL-MCNC: 182 MG/DL
CHOLEST/HDLC SERPL: 2 (CALC)
HCV AB SERPL QL IA: NORMAL
HDLC SERPL-MCNC: 92 MG/DL
LDLC SERPL CALC-MCNC: 77 MG/DL (CALC)
NONHDLC SERPL-MCNC: 90 MG/DL (CALC)
TRIGL SERPL-MCNC: 47 MG/DL
TSH SERPL-ACNC: 1.12 MIU/L

## 2024-07-09 ENCOUNTER — TELEPHONE (OUTPATIENT)
Dept: OTHER | Facility: OTHER | Age: 54
End: 2024-07-09

## 2024-07-09 NOTE — TELEPHONE ENCOUNTER
Patient called saying that she is returning a phone call that she had got from the office. Patient stated that she is not sure what the call is regarding to, the voicemail that the doctor left was it give the office a call, patient also stated that her mom has an appointment with the office within 2 days with Dr. Jones.

## 2024-07-11 DIAGNOSIS — M32.9 SYSTEMIC LUPUS ERYTHEMATOSUS, UNSPECIFIED SLE TYPE, UNSPECIFIED ORGAN INVOLVEMENT STATUS (HCC): Primary | ICD-10-CM

## 2024-07-11 RX ORDER — HYDROXYCHLOROQUINE SULFATE 200 MG/1
200 TABLET, FILM COATED ORAL 2 TIMES DAILY
Qty: 60 TABLET | Refills: 0 | Status: SHIPPED | OUTPATIENT
Start: 2024-07-11 | End: 2024-08-10

## 2024-07-26 ENCOUNTER — TELEPHONE (OUTPATIENT)
Dept: ADMINISTRATIVE | Facility: OTHER | Age: 54
End: 2024-07-26

## 2024-07-26 NOTE — TELEPHONE ENCOUNTER
----- Message from PATRICIA Juan sent at 7/26/2024  9:55 AM EDT -----  Regarding: care gap request  Contact: [phone]  12/03/20 11:09 AM    Hello, our patient attached above has had Mammogram completed/performed. Please assist in updating the patient chart by pulling the document from the Media Tab. The date of service is scanned on 6/3/24.     Thank you,  PATRICIA Juan  [facility]

## 2024-07-30 DIAGNOSIS — F32.A MILD DEPRESSION: ICD-10-CM

## 2024-07-30 RX ORDER — BUPROPION HYDROCHLORIDE 75 MG/1
75 TABLET ORAL 2 TIMES DAILY
Qty: 180 TABLET | Refills: 1 | Status: SHIPPED | OUTPATIENT
Start: 2024-07-30

## 2024-08-01 ENCOUNTER — RA CDI HCC (OUTPATIENT)
Dept: OTHER | Facility: HOSPITAL | Age: 54
End: 2024-08-01

## 2024-08-01 NOTE — PROGRESS NOTES
HCC coding opportunities       Chart reviewed, no opportunity found: CHART REVIEWED, NO OPPORTUNITY FOUND        Patients Insurance        Commercial Insurance: [employer]

## 2024-08-08 ENCOUNTER — OFFICE VISIT (OUTPATIENT)
Dept: FAMILY MEDICINE CLINIC | Facility: CLINIC | Age: 54
End: 2024-08-08
Payer: COMMERCIAL

## 2024-08-08 VITALS
WEIGHT: 197.8 LBS | HEIGHT: 67 IN | RESPIRATION RATE: 16 BRPM | TEMPERATURE: 97.4 F | BODY MASS INDEX: 31.04 KG/M2 | SYSTOLIC BLOOD PRESSURE: 128 MMHG | DIASTOLIC BLOOD PRESSURE: 74 MMHG | HEART RATE: 76 BPM

## 2024-08-08 DIAGNOSIS — Z23 NEED FOR VACCINATION: ICD-10-CM

## 2024-08-08 DIAGNOSIS — F41.9 ANXIETY: ICD-10-CM

## 2024-08-08 DIAGNOSIS — G47.00 INSOMNIA, UNSPECIFIED TYPE: Primary | ICD-10-CM

## 2024-08-08 DIAGNOSIS — E66.09 CLASS 1 OBESITY DUE TO EXCESS CALORIES WITHOUT SERIOUS COMORBIDITY WITH BODY MASS INDEX (BMI) OF 30.0 TO 30.9 IN ADULT: ICD-10-CM

## 2024-08-08 DIAGNOSIS — Z98.84 S/P GASTRIC BYPASS: ICD-10-CM

## 2024-08-08 PROCEDURE — 99214 OFFICE O/P EST MOD 30 MIN: CPT | Performed by: NURSE PRACTITIONER

## 2024-08-08 PROCEDURE — 90471 IMMUNIZATION ADMIN: CPT

## 2024-08-08 PROCEDURE — 90750 HZV VACC RECOMBINANT IM: CPT

## 2024-08-08 RX ORDER — ALPRAZOLAM 0.25 MG/1
0.25 TABLET ORAL
Qty: 30 TABLET | Refills: 1 | Status: SHIPPED | OUTPATIENT
Start: 2024-08-08 | End: 2024-10-07

## 2024-08-08 RX ORDER — OMEPRAZOLE 20 MG/1
20 CAPSULE, DELAYED RELEASE ORAL DAILY
Qty: 90 CAPSULE | Refills: 1 | Status: SHIPPED | OUTPATIENT
Start: 2024-08-08 | End: 2025-02-04

## 2024-08-08 NOTE — PATIENT INSTRUCTIONS
Patient Education     Alprazolam (al PRABRIAN michelle eli)   Brand Names: US ALPRAZolam Intensol; ALPRAZolam XR; Xanax; Xanax XR   Brand Names: Rocky ALPRAZolam TS; APO-Alpraz; APO-Alpraz TS; PRO-Alprazolam; TEVA-Alprazolam; Xanax; Xanax TS   Warning   This drug is a benzodiazepine. The use of a benzodiazepine drug along with opioid drugs has led to very bad side effects. Side effects that have happened include slowed or trouble breathing and death. Opioid drugs include drugs like codeine, oxycodone, and morphine. Opioid drugs are used to treat pain and some are used to treat cough. Talk with the doctor.  If you are taking this drug with an opioid drug, get medical help right away if you feel very sleepy or dizzy; if you have slow, shallow, or trouble breathing; or if you pass out. Caregivers or others need to get medical help right away if the patient does not respond, does not answer or react like normal, or will not wake up.  Benzodiazepines can put you at risk for drug use disorder. Misuse or abuse of this drug can lead to overdose or death, especially when used along with certain other drugs, alcohol, or street drugs. Drug use disorder can happen even if you take this drug as your doctor has told you. Get medical help right away if you have changes in mood or behavior, suicidal thoughts or actions, seizures, or trouble breathing.  You will be watched closely to make sure you do not misuse this drug or develop drug use disorder.  Benzodiazepines may cause dependence. Lowering the dose or stopping this drug all of a sudden may cause withdrawal. This can be life-threatening. The risk of dependence and withdrawal are raised the longer you take this drug and the higher the dose. Talk to your doctor before you lower the dose or stop this drug. You will need to follow your doctor's instructions. Get medical help right away if you have trouble controlling body movements, seizures, new or worse behavior or mood changes like  depression or thoughts of suicide, thoughts of harming someone, hallucinations (seeing or hearing things that are not there), losing contact with reality, moving around or talking a lot, or any other bad effects.  Sometimes, withdrawal signs can last for several weeks to more than 12 months. Tell your doctor if you have anxiety; trouble with memory, learning, or focusing; trouble sleeping; burning, numbness, or tingling; weakness; shaking; muscle twitching; ringing in the ears; or any other bad effects.  What is this drug used for?   It is used to treat anxiety.  It is used to treat panic attacks.  What do I need to tell my doctor BEFORE I take this drug?   If you are allergic to this drug; any part of this drug; or any other drugs, foods, or substances. Tell your doctor about the allergy and what signs you had.  If you have glaucoma.  If you are taking any of these drugs: Clarithromycin, itraconazole, or ketoconazole. There may be other drugs that must not be taken with this drug. Your doctor or pharmacist can tell you if you are taking a drug that must not be taken with this drug.  If you are breast-feeding. Do not breast-feed while you take this drug.  This is not a list of all drugs or health problems that interact with this drug.  Tell your doctor and pharmacist about all of your drugs (prescription or OTC, natural products, vitamins) and health problems. You must check to make sure that it is safe for you to take this drug with all of your drugs and health problems. Do not start, stop, or change the dose of any drug without checking with your doctor.  What are some things I need to know or do while I take this drug?   Tell all of your health care providers that you take this drug. This includes your doctors, nurses, pharmacists, and dentists.  If you have been taking this drug for a long time or at high doses, it may not work as well and you may need higher doses to get the same effect. This is known as  tolerance. Call your doctor if this drug stops working well. Do not take more than ordered.  Avoid driving and doing other tasks or actions that call for you to be alert until you see how this drug affects you.  Avoid drinking alcohol while taking this drug.  Talk with your doctor before you use marijuana, other forms of cannabis, or prescription or OTC drugs that may slow your actions.  Have your blood work checked if you are on this drug for a long time. Talk with your doctor.  If you drink grapefruit juice or eat grapefruit often, talk with your doctor.  If you have phenylketonuria (PKU), talk with your doctor. Some products have phenylalanine.  If you start or stop smoking, talk with your doctor. How much drug you take may need to be changed.  If you are 65 or older, use this drug with care. You could have more side effects.  This drug may cause harm to the unborn baby if you take it while you are pregnant. If you are pregnant or you get pregnant while taking this drug, call your doctor right away.  Taking this drug late in pregnancy may raise the chance of breathing or feeding problems, low body temperature, or withdrawal symptoms in the . Talk with the doctor.  What are some side effects that I need to call my doctor about right away?   WARNING/CAUTION: Even though it may be rare, some people may have very bad and sometimes deadly side effects when taking a drug. Tell your doctor or get medical help right away if you have any of the following signs or symptoms that may be related to a very bad side effect:  Signs of an allergic reaction, like rash; hives; itching; red, swollen, blistered, or peeling skin with or without fever; wheezing; tightness in the chest or throat; trouble breathing, swallowing, or talking; unusual hoarseness; or swelling of the mouth, face, lips, tongue, or throat.  Signs of depression, suicidal thoughts, emotional ups and downs, abnormal thinking, anxiety, or lack of interest in  life.  Change in balance.  Shortness of breath.  Very bad dizziness or passing out.  Feeling confused.  Memory problems or loss.  Trouble speaking.  Trouble passing urine.  Trouble controlling body movements.  Muscle twitching.  A fast heartbeat.  Blurred eyesight.  Period (menstrual) changes.  What are some other side effects of this drug?   All drugs may cause side effects. However, many people have no side effects or only have minor side effects. Call your doctor or get medical help if any of these side effects or any other side effects bother you or do not go away:  Feeling dizzy, sleepy, tired, or weak.  Dry mouth.  Appetite changes.  Constipation, diarrhea, throwing up, or upset stomach.  Change in sex interest.  Sex problems.  Weight gain or loss.  Trouble sleeping.  Headache.  Sweating a lot.  These are not all of the side effects that may occur. If you have questions about side effects, call your doctor. Call your doctor for medical advice about side effects.  You may report side effects to your national health agency.  You may report side effects to the FDA at 1-682.669.7288. You may also report side effects at https://www.fda.gov/medwatch.  How is this drug best taken?   Use this drug as ordered by your doctor. Read all information given to you. Follow all instructions closely.  All products:   Take with or without food. Take with food if it causes an upset stomach.  Liquid (solution):   Use the dropper that comes with this drug to measure the drug.  Mix the liquid with water, juice, soda, applesauce, or pudding before taking it.  Swallow the mixture right away. Do not store for use at a later time.  Oral-disintegrating tablet:   If the tablets come in a foil blister, do not push the tablet out of the foil when opening. Use dry hands to take it from the foil.  Place on your tongue and let it dissolve. Water is not needed. Do not swallow it whole. Do not chew, break, or crush it.  Extended-release tablets:    Swallow whole. Do not chew, break, or crush.  What do I do if I miss a dose?   Take a missed dose as soon as you think about it.  If it is close to the time for your next dose, skip the missed dose and go back to your normal time.  Do not take 2 doses at the same time or extra doses.  Some products are to be taken as needed. If you are taking this drug as needed, do not take more often than told by your doctor.  How do I store and/or throw out this drug?   All products:   Store at room temperature in a dry place. Do not store in a bathroom.  Store this drug in a safe place where children cannot see or reach it, and where other people cannot get to it. A locked box or area may help keep this drug safe. Keep all drugs away from pets.  Throw away unused or  drugs. Do not flush down a toilet or pour down a drain unless you are told to do so. Check with your pharmacist if you have questions about the best way to throw out drugs. There may be drug take-back programs in your area.  Liquid (solution):   Protect from light.  Throw away any part not used 90 days after opening.  General drug facts   If your symptoms or health problems do not get better or if they become worse, call your doctor.  Do not share your drugs with others and do not take anyone else's drugs.  Some drugs may have another patient information leaflet. If you have any questions about this drug, please talk with your doctor, nurse, pharmacist, or other health care provider.  This drug comes with an extra patient fact sheet called a Medication Guide. Read it with care. Read it again each time this drug is refilled. If you have any questions about this drug, please talk with the doctor, pharmacist, or other health care provider.  If you think there has been an overdose, call your poison control center or get medical care right away. Be ready to tell or show what was taken, how much, and when it happened.  Consumer Information Use and Disclaimer    This generalized information is a limited summary of diagnosis, treatment, and/or medication information. It is not meant to be comprehensive and should be used as a tool to help the user understand and/or assess potential diagnostic and treatment options. It does NOT include all information about conditions, treatments, medications, side effects, or risks that may apply to a specific patient. It is not intended to be medical advice or a substitute for the medical advice, diagnosis, or treatment of a health care provider based on the health care provider's examination and assessment of a patient's specific and unique circumstances. Patients must speak with a health care provider for complete information about their health, medical questions, and treatment options, including any risks or benefits regarding use of medications. This information does not endorse any treatments or medications as safe, effective, or approved for treating a specific patient. [employer] and its affiliates disclaim any warranty or liability relating to this information or the use thereof. The use of this information is governed by the Terms of Use, available at https://www.[employer].com/en/know/clinical-effectiveness-terms.  Last Reviewed Date   2023-02-09  Copyright   © 2024 [employer] and its affiliates and/or licensors. All rights reserved.  Patient Education     Semaglutide (jeffrey a GLOO tide)   Brand Names: US Ozempic (0.25 or 0.5 MG/DOSE); Ozempic (1 MG/DOSE); Ozempic (2 MG/DOSE); Rybelsus; Wegovy   Brand Names: Rocky Ozempic (0.25 or 0.5 MG/DOSE); Ozempic (1 MG/DOSE); Rybelsus; Wegovy   Warning   This drug has been shown to cause thyroid cancer in some animals. It is not known if this happens in humans. If thyroid cancer happens, it may be deadly if not found and treated early. Call your doctor right away if you have a neck mass, trouble breathing, trouble swallowing, or have hoarseness that will not go away.  Do not use  this drug if you have a health problem called Multiple Endocrine Neoplasia syndrome type 2 (MEN 2), or if you or a family member have had thyroid cancer.  What is this drug used for?   Ozempic prefilled pens and Rybelsus tablets:   It is used to lower blood sugar in people with type 2 diabetes.  Ozempic prefilled pens:   It is used to lower the chance of heart attack, stroke, and death in people with type 2 diabetes and heart disease.  Wegovy prefilled pens:   It is used to help with weight loss in certain people.  It is used to lower the chance of heart attack, stroke, and death in people who are overweight and have heart disease.  What do I need to tell my doctor BEFORE I take this drug?   For all uses of this drug:   If you are allergic to this drug; any part of this drug; or any other drugs, foods, or substances. Tell your doctor about the allergy and what signs you had.  If you have ever had pancreatitis.  If you have or have ever had depression or thoughts of suicide.  If you are using another drug that has the same drug in it.  If you are using another drug like this one. If you are not sure, ask your doctor or pharmacist.  If using for high blood sugar:   If you have type 1 diabetes. Do not use this drug to treat type 1 diabetes.  Tablets:   If you are breast-feeding. Do not breast-feed while you take this drug.  This is not a list of all drugs or health problems that interact with this drug.  Tell your doctor and pharmacist about all of your drugs (prescription or OTC, natural products, vitamins) and health problems. You must check to make sure that it is safe for you to take this drug with all of your drugs and health problems. Do not start, stop, or change the dose of any drug without checking with your doctor.  What are some things I need to know or do while I take this drug?   For all uses of this drug:   Tell all of your health care providers that you take this drug. This includes your doctors, nurses,  pharmacists, and dentists.  Follow the diet and workout plan that your doctor told you about.  Have blood work checked as you have been told by the doctor. Talk with the doctor.  Talk with your doctor before you drink alcohol.  Kidney problems have happened. Sometimes, these may need to be treated in the hospital or with dialysis.  If you cannot drink liquids by mouth or if you have upset stomach, throwing up, or diarrhea that does not go away, you need to avoid getting dehydrated. Contact your doctor to find out what to do. Dehydration may lead to low blood pressure or to new or worsening kidney problems.  Do not share pen or cartridge devices with another person even if the needle has been changed. Sharing these devices may pass infections from one person to another. This includes infections you may not know you have.  If you are planning on getting pregnant, talk with your doctor. You may need to stop taking this drug at least 2 months before getting pregnant.  If using for high blood sugar:   Wear disease medical alert ID (identification).  Check your blood sugar as you have been told by your doctor.  Do not drive if your blood sugar has been low. There is a greater chance of you having a crash.  People taking this drug with other drugs for diabetes may have a raised risk of low blood sugar. Very low blood sugar can lead to seizures, passing out, long lasting brain damage, and sometimes death. Talk with the doctor.  It may be harder to control blood sugar during times of stress such as fever, infection, injury, or surgery. A change in physical activity, exercise, or diet may also affect blood sugar.  Tell your doctor if you are pregnant or may be pregnant.  Ozempic prefilled pens:   Tell your doctor if you are breast-feeding. You will need to talk about any risks to your baby.  Wegovy prefilled pens:   If you have high blood sugar (diabetes), you will need to watch your blood sugar closely.  If you are 75 or  older, use this drug with care. You may have a raised chance of hip or pelvis fracture.  Weight loss during pregnancy may cause harm to the unborn baby. If you get pregnant while taking this drug or if you want to get pregnant, call your doctor right away.  Tell your doctor if you are breast-feeding. You will need to talk about any risks to your baby.  What are some side effects that I need to call my doctor about right away?   WARNING/CAUTION: Even though it may be rare, some people may have very bad and sometimes deadly side effects when taking a drug. Tell your doctor or get medical help right away if you have any of the following signs or symptoms that may be related to a very bad side effect:  For all uses of this drug:   Signs of an allergic reaction, like rash; hives; itching; red, swollen, blistered, or peeling skin with or without fever; wheezing; tightness in the chest or throat; trouble breathing, swallowing, or talking; unusual hoarseness; or swelling of the mouth, face, lips, tongue, or throat.  Signs of kidney problems like unable to pass urine, change in how much urine is passed, blood in the urine, or a big weight gain.  Signs of gallbladder problems like pain in the upper right belly area, right shoulder area, or between the shoulder blades; change in stools; dark urine or yellow skin or eyes; or fever with chills.  Severe dizziness or passing out.  A fast heartbeat.  Change in eyesight.  Low blood sugar can happen. The chance may be raised when this drug is used with other drugs for diabetes. Signs may be dizziness, headache, feeling sleepy or weak, shaking, fast heartbeat, confusion, hunger, or sweating. Call your doctor right away if you have any of these signs. Follow what you have been told to do for low blood sugar. This may include taking glucose tablets, liquid glucose, or some fruit juices.  Severe and sometimes deadly pancreas problems (pancreatitis) have happened with this drug. Call your  doctor right away if you have severe stomach pain, severe back pain, or severe upset stomach or throwing up.  Wegovy prefilled pens:   New or worse behavior or mood changes like depression or thoughts of suicide.  Pain in the hip or pelvis.  What are some other side effects of this drug?   All drugs may cause side effects. However, many people have no side effects or only have minor side effects. Call your doctor or get medical help if any of these side effects or any other side effects bother you or do not go away:  If using for high blood sugar:   Constipation, diarrhea, stomach pain, upset stomach, or throwing up.  Tablets:   Decreased appetite.  Wegovy prefilled pens:   Constipation, diarrhea, stomach pain, upset stomach, or throwing up.  Headache.  Feeling dizzy, tired, or weak.  Nose or throat irritation.  Runny nose.  Bloating.  Burping.  Gas.  Heartburn.  These are not all of the side effects that may occur. If you have questions about side effects, call your doctor. Call your doctor for medical advice about side effects.  You may report side effects to your national health agency.  You may report side effects to the FDA at 1-867.980.2375. You may also report side effects at https://www.fda.gov/medwatch.  How is this drug best taken?   Use this drug as ordered by your doctor. Read all information given to you. Follow all instructions closely.  Tablets:   Take at least 30 minutes before the first food, drink, or drugs of the day.  Take with plain water only. Do not take with more than 4 ounces (120 mL) of water.  Swallow whole. Do not chew, break, or crush.  Keep taking this drug as you have been told by your doctor or other health care provider, even if you feel well.  Prefilled syringes or pens:   It is given as a shot into the fatty part of the skin on the top of the thigh, belly area, or upper arm.  If you will be giving yourself the shot, your doctor or nurse will teach you how to give the shot.  Take  with or without food.  Take the same day each week.  Move the site where you give the shot with each shot.  Do not use if the solution is cloudy, leaking, or has particles.  This drug is clear and colorless. Do not use if the solution changes color.  Wash your hands before and after use.  Remove all pen needle covers before injecting a dose (there may be 2). If you are not sure what type of pen needle you have or how to use it, talk with the doctor.  Keep taking this drug as you have been told by your doctor or other health care provider, even if you feel well.  Throw away needles in a needle/sharp disposal box. Do not reuse needles or other items. When the box is full, follow all local rules for getting rid of it. Talk with a doctor or pharmacist if you have any questions.  If using for high blood sugar:   Attach new needle before each dose.  Take off the needle after each shot. Do not store this device with the needle on it.  Put the cap back on after you are done using your dose.  If you are also using insulin, you may inject this drug and the insulin in the same area of the body but not right next to each other.  Do not mix this drug in the same syringe with insulin.  This product may make a clicking sound as you prepare the dose. Do not prepare the dose by counting the clicks. Doing so could lead to using the wrong dose.  Wegovy prefilled pens:   Each container is for one use only. Use right after opening. Throw away any part of the opened container after the dose is given.  Do not use this drug if it has been dropped or if it is broken.  Do not get this product wet.  What do I do if I miss a dose?   Tablets:   Skip the missed dose and go back to your normal time.  Do not take 2 doses at the same time or extra doses.  Prefilled syringes or pens:   Take a missed dose as soon as you think about it and go back to your normal time.  If it is less than 48 hours until your next dose, skip the missed and go back to  your normal time.  Do not take 2 doses within 48 hours of each other.  If you miss 2 doses, call your doctor.  How do I store and/or throw out this drug?   Tablets:   Store in the original container at room temperature.  Store in a dry place. Do not store in a bathroom.  Prefilled syringes or pens:   Store unopened pens in a refrigerator. Do not freeze.  Do not use if it has been frozen.  Ozempic prefilled pens:   After opening, store in the refrigerator or at room temperature. Throw away any part not used after 56 days.  Protect from heat and light.  Wegovy prefilled pens:   You may store unopened containers at room temperature. If you store at room temperature, throw away any part not used after 28 days.  Store in the outer carton to protect from light.  Protect from heat.  All products:   Keep all drugs in a safe place. Keep all drugs out of the reach of children and pets.  Throw away unused or  drugs. Do not flush down a toilet or pour down a drain unless you are told to do so. Check with your pharmacist if you have questions about the best way to throw out drugs. There may be drug take-back programs in your area.  General drug facts   If your symptoms or health problems do not get better or if they become worse, call your doctor.  Do not share your drugs with others and do not take anyone else's drugs.  Some drugs may have another patient information leaflet. If you have any questions about this drug, please talk with your doctor, nurse, pharmacist, or other health care provider.  This drug comes with an extra patient fact sheet called a Medication Guide. Read it with care. Read it again each time this drug is refilled. If you have any questions about this drug, please talk with the doctor, pharmacist, or other health care provider.  If you think there has been an overdose, call your poison control center or get medical care right away. Be ready to tell or show what was taken, how much, and when it  happened.  Consumer Information Use and Disclaimer   This generalized information is a limited summary of diagnosis, treatment, and/or medication information. It is not meant to be comprehensive and should be used as a tool to help the user understand and/or assess potential diagnostic and treatment options. It does NOT include all information about conditions, treatments, medications, side effects, or risks that may apply to a specific patient. It is not intended to be medical advice or a substitute for the medical advice, diagnosis, or treatment of a health care provider based on the health care provider's examination and assessment of a patient's specific and unique circumstances. Patients must speak with a health care provider for complete information about their health, medical questions, and treatment options, including any risks or benefits regarding use of medications. This information does not endorse any treatments or medications as safe, effective, or approved for treating a specific patient. [employer] and its affiliates disclaim any warranty or liability relating to this information or the use thereof. The use of this information is governed by the Terms of Use, available at https://www.wolterskluwer.com/en/know/clinical-effectiveness-terms.  Last Reviewed Date   2024-04-19  Copyright   © 2024 [employer] and its affiliates and/or licensors. All rights reserved.

## 2024-08-08 NOTE — PROGRESS NOTES
"Assessment/Plan:    1. Insomnia, unspecified type  -     ALPRAZolam (XANAX) 0.25 mg tablet; Take 1 tablet (0.25 mg total) by mouth daily at bedtime as needed for anxiety  2. Anxiety  -     ALPRAZolam (XANAX) 0.25 mg tablet; Take 1 tablet (0.25 mg total) by mouth daily at bedtime as needed for anxiety  3. Class 1 obesity due to excess calories without serious comorbidity with body mass index (BMI) of 30.0 to 30.9 in adult  -     Semaglutide-Weight Management (WEGOVY) 0.25 MG/0.5ML; Inject 0.5 mL (0.25 mg total) under the skin once a week  4. S/P gastric bypass  -     omeprazole (PriLOSEC) 20 mg delayed release capsule; Take 1 capsule (20 mg total) by mouth daily  5. Need for vaccination  -     Zoster Vaccine Recombinant IM            Future Appointments   Date Time Provider Department Center   10/3/2024  8:45 AM Rony Gabriel MD [facility]   11/5/2024  8:15 AM PATRICIA Juan [facility]           Subjective:      Patient ID: Lina Johnson is a 53 y.o. female.    Chief Complaint   Patient presents with   • Follow-up     YC          Vitals:  /74   Pulse 76   Temp (!) 97.4 °F (36.3 °C) (Tympanic)   Resp 16   Ht 5' 7\" (1.702 m)   Wt 89.7 kg (197 lb 12.8 oz)   LMP 04/05/2018   BMI 30.98 kg/m²     HPI  Patient is here to follow up on wellbutrin and hydroxyzine.   Stated that wellbutrin is helping and does not want to increase dosage at this time. Stated that still not able to sleep hydroxyzine and very stressed as lot going on and will try xanax to help with anxiety and sleep and if still no improvement then will follow back in office.  Stated that doxepin is not helping and will stop that.  Stated that not able to loose weight. Wants to consider wegovy. Patient explained and discussed about warning of wegovy of thyroid C-cell tumor risk. Patient denies any h/o medullary thyroid carcinoma or family history and mutiple endocrine neoplasia syndrome type. Patient wants to continue with " medication and will call if having new symptoms likes mass in neck, dysphagia, dyspnea and persistent hoarseness.  Informed that nausea is one of the common adverse effects and if does not get better with time then will follow back or call.  Also discussed other GI adverse effects with wegovy like pancreatitis, cholecystitis, ileus, syncope, increase in heart rate, abdominal pain and vomiting. Patient aware about all risks and would like to start it.   Also informed that might not be covered by insurance    Controlled Substance Review  [place] PDMP or [place]  reviewed: No red flags were identified; safe to proceed with prescription..                   The following portions of the patient's history were reviewed and updated as appropriate: allergies, current medications, past family history, past medical history, past social history, past surgical history and problem list.      Review of Systems   HENT: Negative.     Respiratory: Negative.     Cardiovascular: Negative.    Gastrointestinal: Negative.    Genitourinary:  Negative for difficulty urinating.   Neurological: Negative.    Psychiatric/Behavioral:  Positive for sleep disturbance. The patient is nervous/anxious.          Objective:    Social History     Tobacco Use   Smoking Status Never   • Passive exposure: Never   Smokeless Tobacco Never       Allergies:   Allergies   Allergen Reactions   • Nsaids          Current Outpatient Medications   Medication Sig Dispense Refill   • ALPRAZolam (XANAX) 0.25 mg tablet Take 1 tablet (0.25 mg total) by mouth daily at bedtime as needed for anxiety 30 tablet 1   • buPROPion (WELLBUTRIN) 75 mg tablet TAKE 1 TABLET BY MOUTH TWICE  DAILY 180 tablet 1   • Calcium Ascorbate 500 MG TABS Take 500 mg by mouth daily     • Cholecalciferol (VITAMIN D3) 1000 units CAPS Take by mouth in the morning     • cyclobenzaprine (FLEXERIL) 10 mg tablet Take 1 tablet (10 mg total) by mouth 2 (two) times a day as needed for muscle spasms 20 tablet 0    • hydroxychloroquine (PLAQUENIL) 200 mg tablet Take 1 tablet (200 mg total) by mouth 2 (two) times a day 60 tablet 0   • hydrOXYzine HCL (ATARAX) 25 mg tablet Take 1 tablet (25 mg total) by mouth 2 (two) times a day as needed for anxiety 180 tablet 0   • IRON PO Take by mouth     • Multiple Vitamin (MULTI-VITAMIN DAILY) TABS Take 1 tablet by mouth daily     • omeprazole (PriLOSEC) 20 mg delayed release capsule Take 1 capsule (20 mg total) by mouth daily 90 capsule 1   • Semaglutide-Weight Management (WEGOVY) 0.25 MG/0.5ML Inject 0.5 mL (0.25 mg total) under the skin once a week 2 mL 0     No current facility-administered medications for this visit.          Physical Exam  Constitutional:       Appearance: She is obese.   HENT:      Head: Normocephalic and atraumatic.      Nose: Nose normal.   Eyes:      Conjunctiva/sclera: Conjunctivae normal.   Cardiovascular:      Rate and Rhythm: Normal rate and regular rhythm.      Pulses: Normal pulses.      Heart sounds: Normal heart sounds.   Pulmonary:      Effort: Pulmonary effort is normal.      Breath sounds: Normal breath sounds.   Skin:     General: Skin is warm and dry.      Findings: No rash.   Neurological:      Mental Status: She is alert and oriented to person, place, and time.   Psychiatric:         Mood and Affect: Mood normal.         Behavior: Behavior normal.         Thought Content: Thought content normal.         Judgment: Judgment normal.                     PATRICIA Juan

## 2024-08-12 ENCOUNTER — PATIENT MESSAGE (OUTPATIENT)
Dept: FAMILY MEDICINE CLINIC | Facility: CLINIC | Age: 54
End: 2024-08-12

## 2024-08-12 DIAGNOSIS — Z98.84 S/P GASTRIC BYPASS: ICD-10-CM

## 2024-08-12 DIAGNOSIS — E66.09 CLASS 1 OBESITY DUE TO EXCESS CALORIES WITHOUT SERIOUS COMORBIDITY WITH BODY MASS INDEX (BMI) OF 30.0 TO 30.9 IN ADULT: ICD-10-CM

## 2024-08-12 DIAGNOSIS — G47.00 INSOMNIA, UNSPECIFIED TYPE: Primary | ICD-10-CM

## 2024-08-12 RX ORDER — TIRZEPATIDE 2.5 MG/.5ML
2.5 INJECTION, SOLUTION SUBCUTANEOUS WEEKLY
Qty: 2 ML | Refills: 0 | Status: SHIPPED | OUTPATIENT
Start: 2024-08-12 | End: 2024-09-09

## 2024-08-14 DIAGNOSIS — M32.9 SYSTEMIC LUPUS ERYTHEMATOSUS, UNSPECIFIED SLE TYPE, UNSPECIFIED ORGAN INVOLVEMENT STATUS (HCC): ICD-10-CM

## 2024-08-15 RX ORDER — HYDROXYCHLOROQUINE SULFATE 200 MG/1
200 TABLET, FILM COATED ORAL 2 TIMES DAILY
Qty: 60 TABLET | Refills: 5 | Status: SHIPPED | OUTPATIENT
Start: 2024-08-15 | End: 2025-02-11

## 2024-08-19 ENCOUNTER — TELEPHONE (OUTPATIENT)
Dept: FAMILY MEDICINE CLINIC | Facility: CLINIC | Age: 54
End: 2024-08-19

## 2024-08-27 NOTE — TELEPHONE ENCOUNTER
PA for Tirzepatide (Zepbound) 2.5 mg/0.5 mL auto-injector APPROVED     Date(s) approved 8- - 2-        Patient advised by          [x] MyChart Message  [] Phone call   []LMOM  []L/M to call office as no active Communication consent on file  []Unable to leave detailed message as VM not approved on Communication consent       Pharmacy advised by    [x]Fax  []Phone call    Approval letter scanned into Media No letter not available at this time

## 2024-08-27 NOTE — TELEPHONE ENCOUNTER
PA for Tirzepatide (Zepbound) 2.5 mg/0.5 mL auto-injector SUBMITTED     via    []CMM-KEY:   [x]I[place]-Case ID # [id number]  []Faxed to plan   []Other website   []Phone call Case ID #     Office notes sent, clinical questions answered. Awaiting determination    Turnaround time for your insurance to make a decision on your Prior Authorization can take 7-21 business days.

## 2024-09-10 ENCOUNTER — TELEPHONE (OUTPATIENT)
Age: 54
End: 2024-09-10

## 2024-09-11 NOTE — TELEPHONE ENCOUNTER
Pt. Was returning Kasey's call but, I could not get an answer from the practice. I called twice, Clinical and clerical.  Pls. Ask Kasey to return the pts. Call.    Ty

## 2024-09-12 NOTE — TELEPHONE ENCOUNTER
Patient returning call from dr Fernandez. No answer at the office both clinical or clerical.    Please return call

## 2024-09-14 ENCOUNTER — OFFICE VISIT (OUTPATIENT)
Dept: URGENT CARE | Facility: CLINIC | Age: 54
End: 2024-09-14
Payer: COMMERCIAL

## 2024-09-14 VITALS
DIASTOLIC BLOOD PRESSURE: 68 MMHG | BODY MASS INDEX: 31.67 KG/M2 | WEIGHT: 202.2 LBS | SYSTOLIC BLOOD PRESSURE: 118 MMHG | TEMPERATURE: 97.2 F | RESPIRATION RATE: 16 BRPM | OXYGEN SATURATION: 97 % | HEART RATE: 85 BPM

## 2024-09-14 DIAGNOSIS — R05.1 ACUTE COUGH: ICD-10-CM

## 2024-09-14 DIAGNOSIS — U07.1 COVID-19: Primary | ICD-10-CM

## 2024-09-14 PROCEDURE — 87636 SARSCOV2 & INF A&B AMP PRB: CPT | Performed by: PHYSICIAN ASSISTANT

## 2024-09-14 PROCEDURE — 99213 OFFICE O/P EST LOW 20 MIN: CPT | Performed by: PHYSICIAN ASSISTANT

## 2024-09-14 RX ORDER — NIRMATRELVIR AND RITONAVIR 300-100 MG
3 KIT ORAL 2 TIMES DAILY
Qty: 30 TABLET | Refills: 0 | Status: SHIPPED | OUTPATIENT
Start: 2024-09-14 | End: 2024-09-19

## 2024-09-14 RX ORDER — DOXEPIN 6 MG/1
TABLET, FILM COATED ORAL
COMMUNITY

## 2024-09-14 NOTE — PROGRESS NOTES
[facility]        NAME: Lina Johnson is a 53 y.o. female  : 1970    MRN: [id number]  DATE: 2024  TIME: 1:56 PM    Assessment and Plan   COVID-19 [U07.1]  1. COVID-19  nirmatrelvir & ritonavir (Paxlovid, 300/100,) tablet therapy pack      2. Acute cough  Covid/Flu- Office Collect Normal    Covid/Flu- Office Collect Normal            Patient Instructions   COVID 19  PCR done, mychart for results  Rx paxlovid at patient request due to lupus  Recommend flonase nasal spray for postnasal drip/cough  Warm salt water gargles  Rest, fluids and supportive care  May benefit from a cool mist humidifier on night stand  Tylenol/ibuprofen as needed for pain/fever    Follow up with PCP in 3-5 days.  Proceed to  ER if symptoms worsen.    If tests have been performed at [facility], our office will contact you with results if changes need to be made to the care plan discussed with you at the visit.  You can review your full results on [facility] MyChart.    Chief Complaint     Chief Complaint   Patient presents with    Cough     Reports cough, body aches, inability to taste. States home covid test was positive today and is requesting a PCR Covid test. Using Mucinex DM.          History of Present Illness       Lina is a 53-year-old female who presents to clinic complaining of cold symptoms that started yesterday.  She states starting yesterday started with sore throat, myalgias, headache, cough, rhinorrhea, nasal congestion, and bilateral ear pain.  She did a COVID at home test due to her symptoms and was positive.  She states that her work needs a PCR test.  She describes the cough as dry nonproductive.  She denies any fever, chills, shortness of breath, nausea, vomiting, diarrhea, loss of taste or smell, recent travel, or any known sick contacts.  She does have a history of lupus and on immunosuppression drugs and has taken Paxlovid in the past.        Review of Systems   Review of Systems    Constitutional:  Positive for fatigue. Negative for chills and fever.   HENT:  Positive for congestion, ear pain, rhinorrhea and sore throat.    Respiratory:  Positive for cough. Negative for shortness of breath.    Gastrointestinal:  Negative for diarrhea, nausea and vomiting.   Musculoskeletal:  Negative for myalgias.   Neurological:  Positive for headaches. Negative for dizziness.         Current Medications       Current Outpatient Medications:     ALPRAZolam (XANAX) 0.25 mg tablet, Take 1 tablet (0.25 mg total) by mouth daily at bedtime as needed for anxiety, Disp: 30 tablet, Rfl: 1    buPROPion (WELLBUTRIN) 75 mg tablet, TAKE 1 TABLET BY MOUTH TWICE  DAILY, Disp: 180 tablet, Rfl: 1    Calcium Ascorbate 500 MG TABS, Take 500 mg by mouth daily, Disp: , Rfl:     Cholecalciferol (VITAMIN D3) 1000 units CAPS, Take by mouth in the morning, Disp: , Rfl:     cyclobenzaprine (FLEXERIL) 10 mg tablet, Take 1 tablet (10 mg total) by mouth 2 (two) times a day as needed for muscle spasms, Disp: 20 tablet, Rfl: 0    Doxepin HCl 6 MG TABS, Oral for 30 Days, Disp: , Rfl:     hydroxychloroquine (PLAQUENIL) 200 mg tablet, Take 1 tablet (200 mg total) by mouth 2 (two) times a day, Disp: 60 tablet, Rfl: 5    IRON PO, Take by mouth, Disp: , Rfl:     Multiple Vitamin (MULTI-VITAMIN DAILY) TABS, Take 1 tablet by mouth daily, Disp: , Rfl:     nirmatrelvir & ritonavir (Paxlovid, 300/100,) tablet therapy pack, Take 3 tablets by mouth 2 (two) times a day for 5 days, Disp: 30 tablet, Rfl: 0    omeprazole (PriLOSEC) 20 mg delayed release capsule, Take 1 capsule (20 mg total) by mouth daily, Disp: 90 capsule, Rfl: 1    Ubrogepant (Ubrelvy) 100 MG tablet, take 1 tablet by oral route once may repeat dose once after 2 hours if needed, not to exceed 200 mg in24 hours Oral as needed for 30 days, Disp: , Rfl:     hydrOXYzine HCL (ATARAX) 25 mg tablet, Take 1 tablet (25 mg total) by mouth 2 (two) times a day as needed for anxiety, Disp: 180  tablet, Rfl: 0    Current Allergies     Allergies as of 2024 - Reviewed 2024   Allergen Reaction Noted    Nsaids Other (See Comments) 2015            The following portions of the patient's history were reviewed and updated as appropriate: allergies, current medications, past family history, past medical history, past social history, past surgical history and problem list.     Past Medical History:   Diagnosis Date    Condyloma acuminata 2012    Obesity     Last assessed 13- morbid    SLE (systemic lupus erythematosus) ([facility])     Vitamin D deficiency     Last assessed 14       Past Surgical History:   Procedure Laterality Date    CERVICAL BIOPSY  W/ LOOP ELECTRODE EXCISION      DILATION AND CURETTAGE OF UTERUS      GYNECOLOGIC CRYOSURGERY      cervix    HYSTERECTOMY      INDUCED   1998    STOMACH SURGERY      bypass    TONSILLECTOMY         Family History   Problem Relation Age of Onset    Heart failure Mother         Congestive    Coronary artery disease Mother     Hypertension Mother     Hypertension Father     Hypertension Sister     Diabetes Brother     Hypertension Brother     Anemia Family     Arthritis Family     Other Family         Blood transfusion    Cervical cancer Family     Cancer Family         Gastric    Ulcers Family     Uterine cancer Family          Medications have been verified.        Objective   /68   Pulse 85   Temp (!) 97.2 °F (36.2 °C) (Tympanic)   Resp 16   Wt 91.7 kg (202 lb 3.2 oz)   LMP 2018   SpO2 97%   BMI 31.67 kg/m²   Patient's last menstrual period was 2018.       Physical Exam     Physical Exam  Vitals and nursing note reviewed.   Constitutional:       General: She is not in acute distress.     Appearance: Normal appearance. She is not ill-appearing.   Cardiovascular:      Rate and Rhythm: Normal rate and regular rhythm.      Heart sounds: Normal heart sounds.   Pulmonary:      Effort: Pulmonary effort  is normal. No respiratory distress.      Breath sounds: Normal breath sounds. No stridor. No wheezing, rhonchi or rales.   Neurological:      Mental Status: She is alert and oriented to person, place, and time.   Psychiatric:         Mood and Affect: Mood normal.         Behavior: Behavior normal.

## 2024-09-15 LAB
FLUAV RNA RESP QL NAA+PROBE: NEGATIVE
FLUBV RNA RESP QL NAA+PROBE: NEGATIVE
SARS-COV-2 RNA RESP QL NAA+PROBE: POSITIVE

## 2024-09-19 ENCOUNTER — OFFICE VISIT (OUTPATIENT)
Dept: FAMILY MEDICINE CLINIC | Facility: CLINIC | Age: 54
End: 2024-09-19
Payer: COMMERCIAL

## 2024-09-19 ENCOUNTER — PATIENT MESSAGE (OUTPATIENT)
Dept: FAMILY MEDICINE CLINIC | Facility: CLINIC | Age: 54
End: 2024-09-19

## 2024-09-19 VITALS
HEART RATE: 84 BPM | DIASTOLIC BLOOD PRESSURE: 76 MMHG | WEIGHT: 194.6 LBS | BODY MASS INDEX: 30.54 KG/M2 | TEMPERATURE: 97.8 F | HEIGHT: 67 IN | SYSTOLIC BLOOD PRESSURE: 124 MMHG | RESPIRATION RATE: 17 BRPM

## 2024-09-19 DIAGNOSIS — M32.9 SYSTEMIC LUPUS ERYTHEMATOSUS, UNSPECIFIED SLE TYPE, UNSPECIFIED ORGAN INVOLVEMENT STATUS (HCC): ICD-10-CM

## 2024-09-19 DIAGNOSIS — Z02.89 ENCOUNTER FOR COMPLETION OF FORM WITH PATIENT: ICD-10-CM

## 2024-09-19 DIAGNOSIS — Z98.84 S/P GASTRIC BYPASS: ICD-10-CM

## 2024-09-19 DIAGNOSIS — U07.1 COVID-19: Primary | ICD-10-CM

## 2024-09-19 DIAGNOSIS — E66.09 CLASS 1 OBESITY DUE TO EXCESS CALORIES WITHOUT SERIOUS COMORBIDITY WITH BODY MASS INDEX (BMI) OF 30.0 TO 30.9 IN ADULT: ICD-10-CM

## 2024-09-19 PROCEDURE — 99214 OFFICE O/P EST MOD 30 MIN: CPT | Performed by: NURSE PRACTITIONER

## 2024-09-19 RX ORDER — METHYLPREDNISOLONE 4 MG
TABLET, DOSE PACK ORAL
Qty: 21 TABLET | Refills: 0 | Status: SHIPPED | OUTPATIENT
Start: 2024-09-19

## 2024-09-19 RX ORDER — TIRZEPATIDE 5 MG/.5ML
5 INJECTION, SOLUTION SUBCUTANEOUS WEEKLY
Qty: 2 ML | Refills: 0 | Status: SHIPPED | OUTPATIENT
Start: 2024-09-19

## 2024-09-19 NOTE — ASSESSMENT & PLAN NOTE
Orders:    tirzepatide (Zepbound) 5 mg/0.5 mL auto-injector; Inject 0.5 mL (5 mg total) under the skin once a week

## 2024-09-19 NOTE — LETTER
September 19, 2024     Patient: Lina Johnson  YOB: 1970  Date of Visit: 9/19/2024      To Whom it May Concern:    Lina Johsnon is under my professional care. Lina was seen in my office on 9/19/2024. Lina may return to work on 9/25/24 .    If you have any questions or concerns, please don't hesitate to call.         Sincerely,          PATRICIA Juan        CC: No Recipients

## 2024-09-19 NOTE — PROGRESS NOTES
COVID-19 Outpatient Progress Note  Name: Lina Johnson      : 1970      MRN: [id number]  Encounter Provider: PATRICIA Juan  Encounter Date: 2024   Encounter department: [facility]    Assessment & Plan  COVID-19    Orders:    methylPREDNISolone 4 MG tablet therapy pack; Use as directed on package    Class 1 obesity due to excess calories without serious comorbidity with body mass index (BMI) of 30.0 to 30.9 in adult    Orders:    tirzepatide (Zepbound) 5 mg/0.5 mL auto-injector; Inject 0.5 mL (5 mg total) under the skin once a week    S/P gastric bypass    Orders:    tirzepatide (Zepbound) 5 mg/0.5 mL auto-injector; Inject 0.5 mL (5 mg total) under the skin once a week    Encounter for completion of form with patient         Systemic lupus erythematosus, unspecified SLE type, unspecified organ involvement status (HCC)  Managed by Rheumatologist         Disposition:     Discussed symptom directed medication options with patient. Discussed vitamin D, vitamin C, and/or zinc supplementation with patient.     I have spent a total time of 15 minutes on the day of the encounter for this patient including discussing prognosis, risks and benefits of treatment options, instructions for management, patient and family education and importance of treatment compliance. Take prednisone with food in morning and do not take any NSAID's while taking prednisone.  Increase fluid intake, saline nasal rinses, and hot tea with honey and lemon.   Cool air humidification can be helpful as well.   May take Tylenol as needed for pain or fevers.    Mucinex D for sinus congestion or Coricidin HBP if you have high blood pressure or a heart condition.    Mucinex or Robitussin DM are effective for cough and chest congestion.    Supportive care discussed and advised.  Advised to RTO for any worsening and no improvement.   Follow up for no improvement and worsening of conditions.  Patient advised and educated when to  see immediate medical care.               Encounter provider: PATRICIA Juan     Provider located at: [facility]  [address]  [address]  [facility] [address]     Recent Visits  No visits were found meeting these conditions.  Showing recent visits within past 7 days and meeting all other requirements  Today's Visits  Date Type Provider Dept   09/19/24 Office Visit PATRICIA Juan [facility]   Showing today's visits and meeting all other requirements  Future Appointments  No visits were found meeting these conditions.  Showing future appointments within next 150 days and meeting all other requirements    History of Present Illness      Subjective:   Lina Johnson is a 53 y.o. female who has been screened for COVID-19. Symptom change since last report: unchanged. Patient's symptoms include fatigue and sore throat. Patient denies fever, chills, malaise, congestion, rhinorrhea, anosmia, loss of taste, cough, shortness of breath, chest tightness, abdominal pain, nausea, vomiting, diarrhea, myalgias and headaches.     - Date of symptom onset: 9/13/2024  - Date of positive COVID-19 test: 9/14/2024. Type of test: PCR.     COVID-19 vaccination status: Not vaccinated    Lina has been staying home and has isolated themselves in her home. She is taking care to not share personal items and is cleaning all surfaces that are touched often, like counters, tabletops, and doorknobs using household cleaning sprays or wipes. She is wearing a mask when she leaves her room.     Patient started taking zepbound on 9/13/24. Stated that tolerating it well.  Also today will have last dose of paxlovid but still not feeling better and will start 24 hours after last dose of paxlovid if no improvement.      Lab Results   Component Value Date    SARSCOV2 Positive (A) 09/14/2024    SARSCOV2 Positive (A) 11/18/2020    SARSCORONAVI Not Detected 04/25/2022       Review of Systems   Constitutional:   "Positive for fatigue. Negative for chills, diaphoresis, fever and unexpected weight change.   HENT:  Positive for sore throat. Negative for congestion, dental problem, drooling, ear discharge, ear pain, facial swelling, hearing loss, mouth sores, nosebleeds, postnasal drip, rhinorrhea, sinus pressure, sinus pain, sneezing, tinnitus, trouble swallowing and voice change.    Respiratory:  Negative for cough, chest tightness, shortness of breath and wheezing.    Cardiovascular: Negative.    Gastrointestinal:  Negative for abdominal pain, constipation, diarrhea, nausea and vomiting.   Musculoskeletal:  Negative for myalgias.   Skin: Negative.    Neurological:  Negative for dizziness, light-headedness and headaches.     Objective     /76   Pulse 84   Temp 97.8 °F (36.6 °C)   Resp 17   Ht 5' 7\" (1.702 m)   Wt 88.3 kg (194 lb 9.6 oz)   LMP 04/05/2018   BMI 30.48 kg/m²   Wt Readings from Last 3 Encounters:   09/19/24 88.3 kg (194 lb 9.6 oz)   09/14/24 91.7 kg (202 lb 3.2 oz)   08/08/24 89.7 kg (197 lb 12.8 oz)      Physical Exam  Vitals reviewed.   Constitutional:       Appearance: She is well-developed.   HENT:      Head: Normocephalic.      Right Ear: External ear normal.      Left Ear: External ear normal.      Nose: Nose normal.   Eyes:      Conjunctiva/sclera: Conjunctivae normal.   Cardiovascular:      Rate and Rhythm: Normal rate and regular rhythm.      Heart sounds: Normal heart sounds.   Pulmonary:      Effort: Pulmonary effort is normal.      Breath sounds: Normal breath sounds.   Abdominal:      General: Bowel sounds are normal.      Palpations: Abdomen is soft.      Tenderness: There is no abdominal tenderness.   Musculoskeletal:      Cervical back: Normal range of motion.   Skin:     General: Skin is warm and dry.      Findings: No rash.   Neurological:      Mental Status: She is alert and oriented to person, place, and time.   Psychiatric:         Mood and Affect: Mood normal.         Behavior: " Behavior normal.         Thought Content: Thought content normal.         Judgment: Judgment normal.

## 2024-09-19 NOTE — PATIENT INSTRUCTIONS
Patient Education     Methylprednisolone (meth il pred NIS oh lone)   Brand Names: [place] DEPO-Medrol; Medrol; P-Care D40 [DSC]; P-Care D80 [DSC]; SOLU-Medrol   Brand Names: [place] Depo-Medrol; Medrol; Solu-MEDROL; SOLU-Medrol (PF); Uni-Med [DSC]   What is this drug used for?   It is used for many health problems like allergy signs, asthma, adrenal gland problems, blood problems, skin rashes, or swelling problems. This is not a list of all health problems that this drug may be used for. Talk with the doctor.  What do I need to tell my doctor BEFORE I take this drug?   All products:   If you are allergic to this drug; any part of this drug; or any other drugs, foods, or substances. Tell your doctor about the allergy and what signs you had.  If you have any of these health problems: A fungal infection or a malaria infection in the brain.  If you have a herpes infection of the eye.  If you have nerve problems in the eye.  Injection (if given in the muscle):   If you have idiopathic thrombocytopenic purpura (ITP).  This is not a list of all drugs or health problems that interact with this drug.  Tell your doctor and pharmacist about all of your drugs (prescription or OTC, natural products, vitamins) and health problems. You must check to make sure that it is safe for you to take this drug with all of your drugs and health problems. Do not start, stop, or change the dose of any drug without checking with your doctor.  What are some things I need to know or do while I take this drug?   All products:   Tell all of your health care providers that you take this drug. This includes your doctors, nurses, pharmacists, and dentists.  This drug may affect allergy skin tests. Be sure your doctor and lab workers know you take this drug.  You may have more chance of getting an infection. Wash hands often. Stay away from people with infections, colds, or flu.  Call your doctor right away if you have any signs of infection like fever,  chills, flu-like signs, very bad sore throat, ear or sinus pain, cough, more sputum or change in color of sputum, pain with passing urine, mouth sores, or a wound that will not heal.  Chickenpox and measles can be very bad or even deadly in some people taking steroid drugs like this drug. Avoid being near anyone with chickenpox or measles if you have not had these health problems before. If you have been exposed to chickenpox or measles, talk with your doctor.  This drug lowers how much natural steroid your body makes. Tell your doctor if you have fever, infection, surgery, or injury. Your body's normal response to these stresses may be affected. You may need extra doses of steroid.  Long-term use may raise the chance of cataracts or glaucoma. Talk with the doctor.  This drug may cause weak bones (osteoporosis) with long-term use. Talk with your doctor to see if you have a higher chance of weak bones or if you have any questions.  Talk with your doctor before getting any vaccines. Use of some vaccines with this drug may either raise the chance of an infection or make the vaccine not work as well.  If you have high blood sugar (diabetes), you will need to watch your blood sugar closely.  Talk with your doctor before you drink alcohol.  Liver problems have rarely happened with this drug. Sometimes, this has been deadly. Call your doctor right away if you have signs of liver problems like dark urine, tiredness, decreased appetite, upset stomach or stomach pain, light-colored stools, throwing up, or yellow skin or eyes.  Patients with cancer may be at greater risk of getting a bad and sometimes deadly health problem called tumor lysis syndrome (TLS). Talk with the doctor.  If you are 65 or older, use this drug with care. You could have more side effects.  This drug may affect growth in children and teens in some cases. They may need regular growth checks. Talk with the doctor.  Tell your doctor if you are pregnant, plan  on getting pregnant, or are breast-feeding. You will need to talk about the benefits and risks to you and the baby.  Injection:   Very bad health problems have happened when drugs like this one have been given into the spine (epidural). These include paralysis, loss of eyesight, stroke, and sometimes death. It is not known if drugs like this one are safe and effective when given into the spine. These drugs are not approved for this use. Talk with the doctor.  Some products have benzyl alcohol. If possible, avoid products with benzyl alcohol in newborns or infants. Serious side effects can happen in these children with some doses of benzyl alcohol, including if given with other drugs that have benzyl alcohol. Talk with the doctor to see if this product has benzyl alcohol in it.  What are some side effects that I need to call my doctor about right away?   WARNING/CAUTION: Even though it may be rare, some people may have very bad and sometimes deadly side effects when taking a drug. Tell your doctor or get medical help right away if you have any of the following signs or symptoms that may be related to a very bad side effect:  Signs of an allergic reaction, like rash; hives; itching; red, swollen, blistered, or peeling skin with or without fever; wheezing; tightness in the chest or throat; trouble breathing, swallowing, or talking; unusual hoarseness; or swelling of the mouth, face, lips, tongue, or throat.  Signs of high blood sugar like confusion, feeling sleepy, unusual thirst or hunger, passing urine more often, flushing, fast breathing, or breath that smells like fruit.  Signs of Cushing's disease like weight gain in the upper back or belly, moon face, very bad headache, or slow healing.  Signs of a weak adrenal gland like a severe upset stomach or throwing up, severe dizziness or passing out, muscle weakness, feeling very tired, mood changes, decreased appetite, or weight loss.  Signs of low potassium levels like  muscle pain or weakness, muscle cramps, or a heartbeat that does not feel normal.  Signs of a pancreas problem (pancreatitis) like very bad stomach pain, very bad back pain, or very bad upset stomach or throwing up.  Signs of high blood pressure like very bad headache or dizziness, passing out, or change in eyesight.  Shortness of breath, a big weight gain, or swelling in the arms or legs.  Not able to pass urine or change in how much urine is passed.  Skin changes (pimples, stretch marks, slow healing, hair growth).  Chest pain or pressure.  Fast, slow, or abnormal heartbeat.  Period (menstrual) changes.  Bone or joint pain.  Change in eyesight.  Mental, mood, or behavior changes that are new or worse.  Seizures.  A burning, numbness, or tingling feeling that is not normal.  Any unexplained bruising or bleeding.  Severe stomach pain.  Black, tarry, or bloody stools.  Throwing up blood or throw up that looks like coffee grounds.  What are some other side effects of this drug?   All drugs may cause side effects. However, many people have no side effects or only have minor side effects. Call your doctor or get medical help if any of these side effects or any other side effects bother you or do not go away:  Upset stomach or throwing up.  Trouble sleeping.  Restlessness.  Sweating a lot.  Headache.  These are not all of the side effects that may occur. If you have questions about side effects, call your doctor. Call your doctor for medical advice about side effects.  You may report side effects to your national health agency.  You may report side effects to the FDA at 1-105.727.5205. You may also report side effects at https://www.fda.gov/medwatch.  How is this drug best taken?   Use this drug as ordered by your doctor. Read all information given to you. Follow all instructions closely.  Tablets:   Take in the morning if taking once a day.  Take with food.  Keep taking this drug as you have been told by your doctor or  other health care provider, even if you feel well.  Injection:   It is given as a shot.  All products:   Tell your doctor if you have missed a dose or recently stopped this drug and you feel very tired, weak, or shaky, or have a fast heartbeat, confusion, sweating, or dizziness.  If you have been taking this drug for many weeks, talk with your doctor before stopping. You may want to slowly stop this drug.  Have your blood work checked as you have been told by your doctor. You may also need to have your eye pressure and bone density checked if you take this drug for a long time.  You may need to lower how much salt is in your diet and take extra potassium. Talk with your doctor.  What do I do if I miss a dose?   Tablets:   Take a missed dose as soon as you think about it.  If it is close to the time for your next dose, skip the missed dose and go back to your normal time.  Do not take 2 doses at the same time or extra doses.  Injection:   Call your doctor to find out what to do.  How do I store and/or throw out this drug?   Tablets:   Store at room temperature in a dry place. Do not store in a bathroom.  Injection:   If you need to store this drug at home, talk with your doctor, nurse, or pharmacist about how to store it.  All products:   Keep all drugs in a safe place. Keep all drugs out of the reach of children and pets.  Throw away unused or  drugs. Do not flush down a toilet or pour down a drain unless you are told to do so. Check with your pharmacist if you have questions about the best way to throw out drugs. There may be drug take-back programs in your area.  General drug facts   If your symptoms or health problems do not get better or if they become worse, call your doctor.  Do not share your drugs with others and do not take anyone else's drugs.  Some drugs may have another patient information leaflet. If you have any questions about this drug, please talk with your doctor, nurse, pharmacist, or other  health care provider.  Some drugs may have another patient information leaflet. Check with your pharmacist. If you have any questions about this drug, please talk with your doctor, nurse, pharmacist, or other health care provider.  If you think there has been an overdose, call your poison control center or get medical care right away. Be ready to tell or show what was taken, how much, and when it happened.  Consumer Information Use and Disclaimer   This generalized information is a limited summary of diagnosis, treatment, and/or medication information. It is not meant to be comprehensive and should be used as a tool to help the user understand and/or assess potential diagnostic and treatment options. It does NOT include all information about conditions, treatments, medications, side effects, or risks that may apply to a specific patient. It is not intended to be medical advice or a substitute for the medical advice, diagnosis, or treatment of a health care provider based on the health care provider's examination and assessment of a patient's specific and unique circumstances. Patients must speak with a health care provider for complete information about their health, medical questions, and treatment options, including any risks or benefits regarding use of medications. This information does not endorse any treatments or medications as safe, effective, or approved for treating a specific patient. [employer] and its affiliates disclaim any warranty or liability relating to this information or the use thereof. The use of this information is governed by the Terms of Use, available at https://www.woltersManifestuwer.com/en/know/clinical-effectiveness-terms.  Last Reviewed Date   2024-01-26  Copyright   © 2024 [employer] and its affiliates and/or licensors. All rights reserved.

## 2024-09-23 ENCOUNTER — TELEPHONE (OUTPATIENT)
Dept: FAMILY MEDICINE CLINIC | Facility: CLINIC | Age: 54
End: 2024-09-23

## 2024-09-25 NOTE — TELEPHONE ENCOUNTER
Please have pcp enter this medication onto her current med list, it is still under historical provider and is unable to be selected for submitting.

## 2024-09-25 NOTE — TELEPHONE ENCOUNTER
Xavier Sykes, you can review HPI from note 8/8/24 where I documented that patient stated that doxepin is not helping and will stop it. PATRICIA Juan

## 2024-10-03 ENCOUNTER — CONSULT (OUTPATIENT)
Dept: RHEUMATOLOGY | Facility: CLINIC | Age: 54
End: 2024-10-03
Payer: COMMERCIAL

## 2024-10-03 VITALS
WEIGHT: 195 LBS | BODY MASS INDEX: 30.61 KG/M2 | HEIGHT: 67 IN | SYSTOLIC BLOOD PRESSURE: 124 MMHG | DIASTOLIC BLOOD PRESSURE: 64 MMHG

## 2024-10-03 DIAGNOSIS — Z79.899 HIGH RISK MEDICATION USE: ICD-10-CM

## 2024-10-03 DIAGNOSIS — M32.9 SYSTEMIC LUPUS ERYTHEMATOSUS, UNSPECIFIED SLE TYPE, UNSPECIFIED ORGAN INVOLVEMENT STATUS (HCC): Primary | ICD-10-CM

## 2024-10-03 PROCEDURE — 99204 OFFICE O/P NEW MOD 45 MIN: CPT | Performed by: INTERNAL MEDICINE

## 2024-10-03 RX ORDER — HYDROXYCHLOROQUINE SULFATE 200 MG/1
200 TABLET, FILM COATED ORAL 2 TIMES DAILY
Qty: 180 TABLET | Refills: 2 | Status: SHIPPED | OUTPATIENT
Start: 2024-10-03 | End: 2025-06-30

## 2024-10-03 RX ORDER — MELOXICAM 7.5 MG/1
7.5 TABLET ORAL DAILY
Qty: 90 TABLET | Refills: 2 | Status: SHIPPED | OUTPATIENT
Start: 2024-10-03 | End: 2024-10-23 | Stop reason: SDUPTHER

## 2024-10-03 NOTE — PATIENT INSTRUCTIONS
Do labs in a couple of weeks  Continue hydroxychloroquine twice a day; continue regular eye exams  Can take meloxicam daily as needed for joint pain, take with food    Return to clinic in 6 months
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
## 2024-10-07 DIAGNOSIS — E66.09 CLASS 1 OBESITY DUE TO EXCESS CALORIES WITHOUT SERIOUS COMORBIDITY WITH BODY MASS INDEX (BMI) OF 30.0 TO 30.9 IN ADULT: ICD-10-CM

## 2024-10-07 DIAGNOSIS — Z98.84 S/P GASTRIC BYPASS: ICD-10-CM

## 2024-10-07 DIAGNOSIS — E66.811 CLASS 1 OBESITY DUE TO EXCESS CALORIES WITHOUT SERIOUS COMORBIDITY WITH BODY MASS INDEX (BMI) OF 30.0 TO 30.9 IN ADULT: ICD-10-CM

## 2024-10-07 RX ORDER — TIRZEPATIDE 5 MG/.5ML
5 INJECTION, SOLUTION SUBCUTANEOUS WEEKLY
Qty: 2 ML | Refills: 0 | Status: CANCELLED | OUTPATIENT
Start: 2024-10-07

## 2024-10-09 RX ORDER — TIRZEPATIDE 7.5 MG/.5ML
7.5 INJECTION, SOLUTION SUBCUTANEOUS WEEKLY
Qty: 2 ML | Refills: 0 | Status: SHIPPED | OUTPATIENT
Start: 2024-10-09

## 2024-10-09 NOTE — TELEPHONE ENCOUNTER
LFTs elevated no statin   Upon review of the In Basket request we  found the  already up to date with the requested item    Any additional questions or concerns should be emailed to the Practice Liaisons via the appropriate education email address, please do not reply via In Basket.    Thank you  Henry Russell MA   [facility]

## 2024-10-18 ENCOUNTER — RA CDI HCC (OUTPATIENT)
Dept: OTHER | Facility: HOSPITAL | Age: 54
End: 2024-10-18

## 2024-10-18 NOTE — PROGRESS NOTES
HCC coding opportunities       Chart reviewed, no opportunity found: CHART REVIEWED, NO OPPORTUNITY FOUND        Patients Insurance        Commercial Insurance: [employer]

## 2024-10-23 ENCOUNTER — TELEPHONE (OUTPATIENT)
Age: 54
End: 2024-10-23

## 2024-10-23 DIAGNOSIS — M32.9 SYSTEMIC LUPUS ERYTHEMATOSUS, UNSPECIFIED SLE TYPE, UNSPECIFIED ORGAN INVOLVEMENT STATUS (HCC): ICD-10-CM

## 2024-10-23 RX ORDER — MELOXICAM 7.5 MG/1
7.5 TABLET ORAL DAILY
Qty: 90 TABLET | Refills: 2 | Status: SHIPPED | OUTPATIENT
Start: 2024-10-23

## 2024-10-23 NOTE — TELEPHONE ENCOUNTER
Close reason: Prior Authorization not required for patient/medication  Payer: [employer] [facility] [employer] Case ID: [id number]  Note from payer: This medication or product is on your plan's list of covered drugs. Prior authorization is not required at this time. If your pharmacy has questions regarding the processing of your prescription, please have them call the [facility] pharmacy help desk at [phone]. **Please note: This request was submitted electronically. Formulary lowering, tiering exception, cost reduction and/or pre-benefit determination review (including prospective Medicare hospice reviews) requests cannot be requested using this method of submission. Providers contact us at [phone] for further assistance.

## 2024-10-25 ENCOUNTER — OFFICE VISIT (OUTPATIENT)
Dept: FAMILY MEDICINE CLINIC | Facility: CLINIC | Age: 54
End: 2024-10-25
Payer: COMMERCIAL

## 2024-10-25 VITALS
HEIGHT: 67 IN | DIASTOLIC BLOOD PRESSURE: 66 MMHG | RESPIRATION RATE: 16 BRPM | TEMPERATURE: 97.2 F | BODY MASS INDEX: 30.04 KG/M2 | SYSTOLIC BLOOD PRESSURE: 116 MMHG | WEIGHT: 191.4 LBS | HEART RATE: 64 BPM

## 2024-10-25 DIAGNOSIS — Z98.84 S/P GASTRIC BYPASS: ICD-10-CM

## 2024-10-25 DIAGNOSIS — E66.09 CLASS 1 OBESITY DUE TO EXCESS CALORIES WITHOUT SERIOUS COMORBIDITY WITH BODY MASS INDEX (BMI) OF 30.0 TO 30.9 IN ADULT: ICD-10-CM

## 2024-10-25 DIAGNOSIS — E66.811 CLASS 1 OBESITY DUE TO EXCESS CALORIES WITHOUT SERIOUS COMORBIDITY WITH BODY MASS INDEX (BMI) OF 30.0 TO 30.9 IN ADULT: ICD-10-CM

## 2024-10-25 PROCEDURE — 99213 OFFICE O/P EST LOW 20 MIN: CPT | Performed by: NURSE PRACTITIONER

## 2024-10-25 RX ORDER — TIRZEPATIDE 7.5 MG/.5ML
7.5 INJECTION, SOLUTION SUBCUTANEOUS WEEKLY
Qty: 2 ML | Refills: 0 | Status: SHIPPED | OUTPATIENT
Start: 2024-10-25

## 2024-10-25 NOTE — PROGRESS NOTES
"Assessment/Plan:    1. Class 1 obesity due to excess calories without serious comorbidity with body mass index (BMI) of 30.0 to 30.9 in adult  -     tirzepatide (Zepbound) 7.5 mg/0.5 mL auto-injector; Inject 0.5 mL (7.5 mg total) under the skin once a week  2. S/P gastric bypass  -     tirzepatide (Zepbound) 7.5 mg/0.5 mL auto-injector; Inject 0.5 mL (7.5 mg total) under the skin once a week        BMI Counseling: Body mass index is 29.98 kg/m². Discussed the patient's BMI with her. The BMI is above normal. Nutrition recommendations include reducing portion sizes, decreasing overall calorie intake, 3-5 servings of fruits/vegetables daily, reducing fast food intake, consuming healthier snacks, decreasing soda and/or juice intake, moderation in carbohydrate intake, increasing intake of lean protein, reducing intake of saturated fat and trans fat, and reducing intake of cholesterol. Exercise recommendations include exercising 3-5 times per week, joining a gym, and strength training exercises.    Patient Instructions:  Return in about 7 weeks (around 12/13/2024), or if symptoms worsen or fail to improve.      Future Appointments   Date Time Provider Department Center   12/6/2024  4:00 PM PATRICIA Juan [facility]   7/10/2025  3:00 PM Rony Gabriel MD [facility]           Subjective:      Patient ID: Lina Johnson is a 53 y.o. female.    Chief Complaint   Patient presents with    Medication Management     Bhargavi Byrne MA     Wt Readings from Last 3 Encounters:   10/25/24 86.8 kg (191 lb 6.4 oz)   10/03/24 88.5 kg (195 lb)   09/19/24 88.3 kg (194 lb 9.6 oz)        Vitals:  /66   Pulse 64   Temp (!) 97.2 °F (36.2 °C)   Resp 16   Ht 5' 7\" (1.702 m)   Wt 86.8 kg (191 lb 6.4 oz)   LMP 04/05/2018   BMI 29.98 kg/m²     HPI  Patient is here to follow up on zepbound. Tolerating medication without any issues. Denies abdominal pain, nausea and vomiting and throat discomfort. Will start 7.5 mg " once 5mg dose is finished. Lost about 4 pounds since last appointment          The following portions of the patient's history were reviewed and updated as appropriate: allergies, current medications, past family history, past medical history, past social history, past surgical history and problem list.      Review of Systems   HENT: Negative.     Respiratory: Negative.     Cardiovascular: Negative.    Gastrointestinal: Negative.    Neurological: Negative.          Objective:    Social History     Tobacco Use   Smoking Status Never    Passive exposure: Never   Smokeless Tobacco Never       Allergies:   Allergies   Allergen Reactions    Nsaids Other (See Comments)     Due to gastric bypass         Current Outpatient Medications   Medication Sig Dispense Refill    ALPRAZolam (XANAX) 0.25 mg tablet Take 1 tablet (0.25 mg total) by mouth daily at bedtime as needed for anxiety 30 tablet 1    buPROPion (WELLBUTRIN) 75 mg tablet TAKE 1 TABLET BY MOUTH TWICE  DAILY 180 tablet 1    Calcium Ascorbate 500 MG TABS Take 500 mg by mouth daily      Cholecalciferol (VITAMIN D3) 1000 units CAPS Take by mouth in the morning      Doxepin HCl 6 MG TABS Oral for 30 Days      hydroxychloroquine (PLAQUENIL) 200 mg tablet Take 1 tablet (200 mg total) by mouth 2 (two) times a day 180 tablet 2    hydrOXYzine HCL (ATARAX) 25 mg tablet Take 1 tablet (25 mg total) by mouth 2 (two) times a day as needed for anxiety 180 tablet 0    meloxicam (Mobic) 7.5 mg tablet Take 1 tablet (7.5 mg total) by mouth daily As needed for joint pain 90 tablet 2    Multiple Vitamin (MULTI-VITAMIN DAILY) TABS Take 1 tablet by mouth daily      omeprazole (PriLOSEC) 20 mg delayed release capsule Take 1 capsule (20 mg total) by mouth daily 90 capsule 1    tirzepatide (Zepbound) 7.5 mg/0.5 mL auto-injector Inject 0.5 mL (7.5 mg total) under the skin once a week 2 mL 0    Ubrogepant (Ubrelvy) 100 MG tablet take 1 tablet by oral route once may repeat dose once after 2  hours if needed, not to exceed 200 mg in24 hours Oral as needed for 30 days      IRON PO Take by mouth (Patient not taking: Reported on 9/19/2024)       No current facility-administered medications for this visit.          Physical Exam  Constitutional:       Appearance: She is obese.   HENT:      Head: Normocephalic and atraumatic.      Nose: Nose normal.   Eyes:      Conjunctiva/sclera: Conjunctivae normal.   Neck:      Thyroid: No thyromegaly or thyroid tenderness.   Cardiovascular:      Rate and Rhythm: Normal rate and regular rhythm.      Pulses: Normal pulses.      Heart sounds: Normal heart sounds.   Pulmonary:      Effort: Pulmonary effort is normal.      Breath sounds: Normal breath sounds.   Skin:     General: Skin is warm and dry.      Findings: No rash.   Neurological:      Mental Status: She is alert and oriented to person, place, and time.   Psychiatric:         Mood and Affect: Mood normal.         Behavior: Behavior normal.         Thought Content: Thought content normal.         Judgment: Judgment normal.                     PATRICIA Juan

## 2024-10-25 NOTE — PATIENT INSTRUCTIONS
Patient Education     Tirzepatide (tir ZEP a tide)   Brand Names: [place] Mounjaro; Zepbound   Brand Names: Rocky Mounjaro   Warning   This drug has been shown to cause thyroid cancer in some animals. It is not known if this happens in humans. If thyroid cancer happens, it may be deadly if not found and treated early. Call your doctor right away if you have a neck mass, trouble breathing, trouble swallowing, or have hoarseness that will not go away.  Do not use this drug if you have a health problem called Multiple Endocrine Neoplasia syndrome type 2 (MEN 2), or if you or a family member have had thyroid cancer.  Have your blood work checked and thyroid ultrasounds as you have been told by your doctor.  What is this drug used for?   It is used to lower blood sugar in people with type 2 diabetes.  It is used to help with weight loss in certain people.  What do I need to tell my doctor BEFORE I take this drug?   All products:   If you are allergic to this drug; any part of this drug; or any other drugs, foods, or substances. Tell your doctor about the allergy and what signs you had.  If you have ever had pancreatitis.  If you have stomach or bowel problems.  If you are using another drug that has the same drug in it.  If you are using another drug like this one. If you are not sure, ask your doctor or pharmacist.  If you are using this drug for diabetes:   If you have type 1 diabetes. Do not use this drug to treat type 1 diabetes.  Zepbound:   If you have or have ever had depression or thoughts of suicide.  This is not a list of all drugs or health problems that interact with this drug.  Tell your doctor and pharmacist about all of your drugs (prescription or OTC, natural products, vitamins) and health problems. You must check to make sure that it is safe for you to take this drug with all of your drugs and health problems. Do not start, stop, or change the dose of any drug without checking with your doctor.  What are  some things I need to know or do while I take this drug?   All products:   Tell all of your health care providers that you take this drug. This includes your doctors, nurses, pharmacists, and dentists.  Follow the diet and workout plan that your doctor told you about.  Talk with your doctor before you drink alcohol.  Birth control pills may not work as well to prevent pregnancy. If you take birth control pills, you may need to switch to another type of hormone-based birth control like a vaginal ring if your doctor tells you to. If another type of hormone-based birth control is not an option, use some other kind of birth control also, like a condom. Do this for 4 weeks after starting this drug and for 4 weeks each time the dose is raised.  This drug may prevent other drugs taken by mouth from getting into the body. If you take other drugs by mouth, you may need to take them at some other time than this drug. Talk with your doctor.  Do not share with another person even if the needle has been changed. Sharing your tray or pen may pass infections from one person to another. This includes infections you may not know you have.  If you cannot drink liquids by mouth or if you have upset stomach, throwing up, or diarrhea that does not go away, you need to avoid getting dehydrated. Contact your doctor to find out what to do. Dehydration may lead to low blood pressure or to new or worsening kidney problems.  A severe and sometimes deadly pancreas problem (pancreatitis) has happened with other drugs like this one.  If you are using this drug for diabetes:   It may be harder to control blood sugar during times of stress such as fever, infection, injury, or surgery. A change in physical activity, exercise, or diet may also affect blood sugar.  Check your blood sugar as you have been told by your doctor.  Do not drive if your blood sugar has been low. There is a greater chance of you having a crash.  Wear disease medical alert ID  (identification).  Tell your doctor if you are pregnant, plan on getting pregnant, or are breast-feeding. You will need to talk about the benefits and risks to you and the baby.  Zepbound:   If you have high blood sugar (diabetes), you will need to watch your blood sugar closely.  Weight loss during pregnancy may cause harm to the unborn baby. If you get pregnant while taking this drug or if you want to get pregnant, call your doctor right away.  Tell your doctor if you are breast-feeding. You will need to talk about any risks to your baby.  What are some side effects that I need to call my doctor about right away?   WARNING/CAUTION: Even though it may be rare, some people may have very bad and sometimes deadly side effects when taking a drug. Tell your doctor or get medical help right away if you have any of the following signs or symptoms that may be related to a very bad side effect:  All products:   Signs of an allergic reaction, like rash; hives; itching; red, swollen, blistered, or peeling skin with or without fever; wheezing; tightness in the chest or throat; trouble breathing, swallowing, or talking; unusual hoarseness; or swelling of the mouth, face, lips, tongue, or throat.  Signs of kidney problems like unable to pass urine, change in how much urine is passed, blood in the urine, or a big weight gain.  Signs of gallbladder problems like pain in the upper right belly area, right shoulder area, or between the shoulder blades; yellow skin or eyes; fever with chills; bloating; or very upset stomach or throwing up.  Signs of a pancreas problem (pancreatitis) like very bad stomach pain, very bad back pain, or very bad upset stomach or throwing up.  Dizziness or passing out.  A fast heartbeat.  Change in eyesight.  Low blood sugar can happen. The chance may be raised when this drug is used with other drugs for diabetes. Signs may be dizziness, headache, feeling sleepy or weak, shaking, fast heartbeat, confusion,  hunger, or sweating. Call your doctor right away if you have any of these signs. Follow what you have been told to do for low blood sugar. This may include taking glucose tablets, liquid glucose, or some fruit juices.  Zepbound:   New or worse behavior or mood changes like depression or thoughts of suicide.  What are some other side effects of this drug?   All drugs may cause side effects. However, many people have no side effects or only have minor side effects. Call your doctor or get medical help if any of these side effects or any other side effects bother you or do not go away:  All products:   Constipation, diarrhea, stomach pain, upset stomach, throwing up, or decreased appetite.  Heartburn.  Pain, itching, or other irritation where the injection was given.  Zepbound:   Feeling tired or weak.  Hair loss.  These are not all of the side effects that may occur. If you have questions about side effects, call your doctor. Call your doctor for medical advice about side effects.  You may report side effects to your national health agency.  You may report side effects to the FDA at 1-265.923.7861. You may also report side effects at https://www.fda.gov/medwatch.  How is this drug best taken?   Use this drug as ordered by your doctor. Read all information given to you. Follow all instructions closely.  All products:   It is given as a shot into the fatty part of the skin on the top of the thigh, belly area, or upper arm.  If you will be giving yourself the shot, your doctor or nurse will teach you how to give the shot.  Keep taking this drug as you have been told by your doctor or other health care provider, even if you feel well.  Take the same day each week.  Move site where you give the shot each time.  Take with or without food.  Wash your hands before and after use.  Do not use if the solution is leaking or has particles.  This drug is colorless to a faint yellow. Do not use if the solution changes color.  Do not  move this drug from the pen to a syringe.  Each pen or vial is for 1 use only. Throw away any part of the used pen after the dose is given.  Throw away needles in a needle/sharp disposal box. Do not reuse needles or other items. When the box is full, follow all local rules for getting rid of it. Talk with a doctor or pharmacist if you have any questions.  Vials:   It is important to have the right syringe to measure your dose. If you do not have the right syringe or you are not sure, talk with your pharmacist.  If you are using this drug for diabetes:   If you are also using insulin, you may inject this drug and the insulin in the same area of the body but not right next to each other.  Do not mix this drug in the same syringe with insulin.  What do I do if I miss a dose?   If it is within 4 days after the missed dose, take the missed dose and go back to your normal day.  If it has been more than 4 days since the missed dose, skip the missed dose and go back to your normal day.  Do not take 2 doses at the same time or extra doses.  How do I store and/or throw out this drug?   Store in a refrigerator. Do not freeze.  Do not use if it has been frozen.  If needed, each pen or vial may be stored at room temperature for up to 21 days. If you store at room temperature, throw away any part not used after 21 days.  Protect from heat.  Store in the original container to protect from light.  Keep all drugs in a safe place. Keep all drugs out of the reach of children and pets.  Throw away unused or  drugs. Do not flush down a toilet or pour down a drain unless you are told to do so. Check with your pharmacist if you have questions about the best way to throw out drugs. There may be drug take-back programs in your area.  General drug facts   If your symptoms or health problems do not get better or if they become worse, call your doctor.  Do not share your drugs with others and do not take anyone else's drugs.  Some drugs  may have another patient information leaflet. If you have any questions about this drug, please talk with your doctor, nurse, pharmacist, or other health care provider.  This drug comes with an extra patient fact sheet called a Medication Guide. Read it with care. Read it again each time this drug is refilled. If you have any questions about this drug, please talk with the doctor, pharmacist, or other health care provider.  If you think there has been an overdose, call your poison control center or get medical care right away. Be ready to tell or show what was taken, how much, and when it happened.  Consumer Information Use and Disclaimer   This generalized information is a limited summary of diagnosis, treatment, and/or medication information. It is not meant to be comprehensive and should be used as a tool to help the user understand and/or assess potential diagnostic and treatment options. It does NOT include all information about conditions, treatments, medications, side effects, or risks that may apply to a specific patient. It is not intended to be medical advice or a substitute for the medical advice, diagnosis, or treatment of a health care provider based on the health care provider's examination and assessment of a patient's specific and unique circumstances. Patients must speak with a health care provider for complete information about their health, medical questions, and treatment options, including any risks or benefits regarding use of medications. This information does not endorse any treatments or medications as safe, effective, or approved for treating a specific patient. [employer] and its affiliates disclaim any warranty or liability relating to this information or the use thereof. The use of this information is governed by the Terms of Use, available at https://www.wolterskluwer.com/en/know/clinical-effectiveness-terms.  Last Reviewed Date   2024-04-17  Copyright   © 2024 [employer] and  its affiliates and/or licensors. All rights reserved.

## 2024-10-30 ENCOUNTER — PATIENT MESSAGE (OUTPATIENT)
Dept: FAMILY MEDICINE CLINIC | Facility: CLINIC | Age: 54
End: 2024-10-30

## 2024-10-30 DIAGNOSIS — Z98.84 S/P GASTRIC BYPASS: ICD-10-CM

## 2024-10-30 DIAGNOSIS — E66.811 CLASS 1 OBESITY DUE TO EXCESS CALORIES WITHOUT SERIOUS COMORBIDITY WITH BODY MASS INDEX (BMI) OF 30.0 TO 30.9 IN ADULT: ICD-10-CM

## 2024-10-30 DIAGNOSIS — E66.09 CLASS 1 OBESITY DUE TO EXCESS CALORIES WITHOUT SERIOUS COMORBIDITY WITH BODY MASS INDEX (BMI) OF 30.0 TO 30.9 IN ADULT: ICD-10-CM

## 2024-10-30 RX ORDER — TIRZEPATIDE 7.5 MG/.5ML
7.5 INJECTION, SOLUTION SUBCUTANEOUS WEEKLY
Qty: 2 ML | Refills: 0 | Status: SHIPPED | OUTPATIENT
Start: 2024-10-30

## 2024-11-19 DIAGNOSIS — G43.809 OTHER MIGRAINE WITHOUT STATUS MIGRAINOSUS, NOT INTRACTABLE: Primary | ICD-10-CM

## 2024-11-19 DIAGNOSIS — Z98.84 S/P GASTRIC BYPASS: ICD-10-CM

## 2024-11-19 DIAGNOSIS — E66.09 CLASS 1 OBESITY DUE TO EXCESS CALORIES WITHOUT SERIOUS COMORBIDITY WITH BODY MASS INDEX (BMI) OF 30.0 TO 30.9 IN ADULT: ICD-10-CM

## 2024-11-19 DIAGNOSIS — E66.811 CLASS 1 OBESITY DUE TO EXCESS CALORIES WITHOUT SERIOUS COMORBIDITY WITH BODY MASS INDEX (BMI) OF 30.0 TO 30.9 IN ADULT: ICD-10-CM

## 2024-11-19 RX ORDER — TIRZEPATIDE 7.5 MG/.5ML
7.5 INJECTION, SOLUTION SUBCUTANEOUS WEEKLY
Qty: 2 ML | Refills: 0 | Status: CANCELLED | OUTPATIENT
Start: 2024-11-19

## 2024-11-20 ENCOUNTER — TELEPHONE (OUTPATIENT)
Dept: FAMILY MEDICINE CLINIC | Facility: CLINIC | Age: 54
End: 2024-11-20

## 2024-11-20 RX ORDER — TIRZEPATIDE 10 MG/.5ML
10 INJECTION, SOLUTION SUBCUTANEOUS WEEKLY
Qty: 2 ML | Refills: 0 | Status: SHIPPED | OUTPATIENT
Start: 2024-11-20

## 2024-11-21 ENCOUNTER — TELEPHONE (OUTPATIENT)
Dept: FAMILY MEDICINE CLINIC | Facility: CLINIC | Age: 54
End: 2024-11-21

## 2024-11-22 NOTE — TELEPHONE ENCOUNTER
PA Ubrelvy 100 MG SUBMITTED     to [email or web address]     via    []CMM-KEY:    [x]Surescripts-Case ID # [id number]  []Availity-Auth ID #  NDC #    []Faxed to plan   []Other website    []Phone call Case ID #      []PA sent as URGENT    All office notes, labs and other pertaining documents and studies sent. Clinical questions answered. Awaiting determination from insurance company.     Turnaround time for your insurance to make a decision on your Prior Authorization can take 7-21 business days.

## 2024-11-25 NOTE — TELEPHONE ENCOUNTER
Patient called back and would like more details as to why this was denied.    She said she's been on this before and it works.  Please advise.

## 2024-11-25 NOTE — TELEPHONE ENCOUNTER
PA Ubrelvy 100 MG DENIED    Reason:(Screenshot if applicable)        Message sent to office clinical pool Yes    Denial letter scanned into Media Yes    Appeal started No (Provider will need to decide if appeal is warranted and send clinical documentation to Prior Authorization Team for initiation.)    **Please follow up with your patient regarding denial and next steps**

## 2024-12-06 ENCOUNTER — OFFICE VISIT (OUTPATIENT)
Dept: FAMILY MEDICINE CLINIC | Facility: CLINIC | Age: 54
End: 2024-12-06
Payer: COMMERCIAL

## 2024-12-06 VITALS
BODY MASS INDEX: 28.82 KG/M2 | SYSTOLIC BLOOD PRESSURE: 108 MMHG | WEIGHT: 184 LBS | TEMPERATURE: 98.9 F | RESPIRATION RATE: 16 BRPM | DIASTOLIC BLOOD PRESSURE: 78 MMHG | HEART RATE: 68 BPM

## 2024-12-06 DIAGNOSIS — Z98.84 S/P GASTRIC BYPASS: ICD-10-CM

## 2024-12-06 DIAGNOSIS — E66.811 CLASS 1 OBESITY DUE TO EXCESS CALORIES WITHOUT SERIOUS COMORBIDITY WITH BODY MASS INDEX (BMI) OF 30.0 TO 30.9 IN ADULT: Primary | ICD-10-CM

## 2024-12-06 DIAGNOSIS — G47.00 INSOMNIA, UNSPECIFIED TYPE: ICD-10-CM

## 2024-12-06 DIAGNOSIS — M32.9 SYSTEMIC LUPUS ERYTHEMATOSUS, UNSPECIFIED SLE TYPE, UNSPECIFIED ORGAN INVOLVEMENT STATUS (HCC): ICD-10-CM

## 2024-12-06 DIAGNOSIS — F41.9 ANXIETY: ICD-10-CM

## 2024-12-06 DIAGNOSIS — F32.A MILD DEPRESSION: ICD-10-CM

## 2024-12-06 DIAGNOSIS — E66.09 CLASS 1 OBESITY DUE TO EXCESS CALORIES WITHOUT SERIOUS COMORBIDITY WITH BODY MASS INDEX (BMI) OF 30.0 TO 30.9 IN ADULT: Primary | ICD-10-CM

## 2024-12-06 PROCEDURE — 99213 OFFICE O/P EST LOW 20 MIN: CPT | Performed by: NURSE PRACTITIONER

## 2024-12-06 RX ORDER — TIRZEPATIDE 15 MG/.5ML
15 INJECTION, SOLUTION SUBCUTANEOUS WEEKLY
Qty: 2 ML | Refills: 0 | Status: SHIPPED | OUTPATIENT
Start: 2025-01-06

## 2024-12-06 RX ORDER — TIRZEPATIDE 12.5 MG/.5ML
12.5 INJECTION, SOLUTION SUBCUTANEOUS WEEKLY
Qty: 2 ML | Refills: 0 | Status: SHIPPED | OUTPATIENT
Start: 2024-12-06

## 2024-12-06 NOTE — PATIENT INSTRUCTIONS
Patient Education     Tirzepatide (tir ZEP a tide)   Brand Names: [place] Mounjaro; Zepbound   Brand Names: Rocky Mounjaro   Warning   This drug has been shown to cause thyroid cancer in some animals. It is not known if this happens in humans. If thyroid cancer happens, it may be deadly if not found and treated early. Call your doctor right away if you have a neck mass, trouble breathing, trouble swallowing, or have hoarseness that will not go away.  Do not use this drug if you have a health problem called Multiple Endocrine Neoplasia syndrome type 2 (MEN 2), or if you or a family member have had thyroid cancer.  Have your blood work checked and thyroid ultrasounds as you have been told by your doctor.  What is this drug used for?   It is used to lower blood sugar in people with type 2 diabetes.  It is used to help with weight loss in certain people.  What do I need to tell my doctor BEFORE I take this drug?   All products:   If you are allergic to this drug; any part of this drug; or any other drugs, foods, or substances. Tell your doctor about the allergy and what signs you had.  If you have ever had pancreatitis.  If you have stomach or bowel problems.  If you are using another drug that has the same drug in it.  If you are using another drug like this one. If you are not sure, ask your doctor or pharmacist.  If you are using this drug for diabetes:   If you have type 1 diabetes. Do not use this drug to treat type 1 diabetes.  Zepbound:   If you have or have ever had depression or thoughts of suicide.  This is not a list of all drugs or health problems that interact with this drug.  Tell your doctor and pharmacist about all of your drugs (prescription or OTC, natural products, vitamins) and health problems. You must check to make sure that it is safe for you to take this drug with all of your drugs and health problems. Do not start, stop, or change the dose of any drug without checking with your doctor.  What are  some things I need to know or do while I take this drug?   All products:   Tell all of your health care providers that you take this drug. This includes your doctors, nurses, pharmacists, and dentists.  Follow the diet and workout plan that your doctor told you about.  Talk with your doctor before you drink alcohol.  Birth control pills may not work as well to prevent pregnancy. If you take birth control pills, you may need to switch to another type of hormone-based birth control like a vaginal ring if your doctor tells you to. If another type of hormone-based birth control is not an option, use some other kind of birth control also, like a condom. Do this for 4 weeks after starting this drug and for 4 weeks each time the dose is raised.  This drug may prevent other drugs taken by mouth from getting into the body. If you take other drugs by mouth, you may need to take them at some other time than this drug. Talk with your doctor.  Do not share with another person even if the needle has been changed. Sharing your tray or pen may pass infections from one person to another. This includes infections you may not know you have.  If you cannot drink liquids by mouth or if you have upset stomach, throwing up, or diarrhea that does not go away, you need to avoid getting dehydrated. Contact your doctor to find out what to do. Dehydration may lead to low blood pressure or to new or worsening kidney problems.  A severe and sometimes deadly pancreas problem (pancreatitis) has happened with other drugs like this one.  If you are using this drug for diabetes:   It may be harder to control blood sugar during times of stress such as fever, infection, injury, or surgery. A change in physical activity, exercise, or diet may also affect blood sugar.  Check your blood sugar as you have been told by your doctor.  Do not drive if your blood sugar has been low. There is a greater chance of you having a crash.  Wear disease medical alert ID  (identification).  Tell your doctor if you are pregnant, plan on getting pregnant, or are breast-feeding. You will need to talk about the benefits and risks to you and the baby.  Zepbound:   If you have high blood sugar (diabetes), you will need to watch your blood sugar closely.  Weight loss during pregnancy may cause harm to the unborn baby. If you get pregnant while taking this drug or if you want to get pregnant, call your doctor right away.  Tell your doctor if you are breast-feeding. You will need to talk about any risks to your baby.  What are some side effects that I need to call my doctor about right away?   WARNING/CAUTION: Even though it may be rare, some people may have very bad and sometimes deadly side effects when taking a drug. Tell your doctor or get medical help right away if you have any of the following signs or symptoms that may be related to a very bad side effect:  All products:   Signs of an allergic reaction, like rash; hives; itching; red, swollen, blistered, or peeling skin with or without fever; wheezing; tightness in the chest or throat; trouble breathing, swallowing, or talking; unusual hoarseness; or swelling of the mouth, face, lips, tongue, or throat.  Signs of kidney problems like unable to pass urine, change in how much urine is passed, blood in the urine, or a big weight gain.  Signs of gallbladder problems like pain in the upper right belly area, right shoulder area, or between the shoulder blades; yellow skin or eyes; fever with chills; bloating; or very upset stomach or throwing up.  Signs of a pancreas problem (pancreatitis) like very bad stomach pain, very bad back pain, or very bad upset stomach or throwing up.  Dizziness or passing out.  A fast heartbeat.  Change in eyesight.  Low blood sugar can happen. The chance may be raised when this drug is used with other drugs for diabetes. Signs may be dizziness, headache, feeling sleepy or weak, shaking, fast heartbeat, confusion,  hunger, or sweating. Call your doctor right away if you have any of these signs. Follow what you have been told to do for low blood sugar. This may include taking glucose tablets, liquid glucose, or some fruit juices.  Zepbound:   New or worse behavior or mood changes like depression or thoughts of suicide.  What are some other side effects of this drug?   All drugs may cause side effects. However, many people have no side effects or only have minor side effects. Call your doctor or get medical help if any of these side effects or any other side effects bother you or do not go away:  All products:   Constipation, diarrhea, stomach pain, upset stomach, throwing up, or decreased appetite.  Heartburn.  Pain, itching, or other irritation where the injection was given.  Zepbound:   Feeling tired or weak.  Hair loss.  These are not all of the side effects that may occur. If you have questions about side effects, call your doctor. Call your doctor for medical advice about side effects.  You may report side effects to your national health agency.  You may report side effects to the FDA at 1-380.189.5240. You may also report side effects at https://www.fda.gov/medwatch.  How is this drug best taken?   Use this drug as ordered by your doctor. Read all information given to you. Follow all instructions closely.  All products:   It is given as a shot into the fatty part of the skin on the top of the thigh, belly area, or upper arm.  If you will be giving yourself the shot, your doctor or nurse will teach you how to give the shot.  Keep taking this drug as you have been told by your doctor or other health care provider, even if you feel well.  Take the same day each week.  Move site where you give the shot each time.  Take with or without food.  Wash your hands before and after use.  Do not use if the solution is leaking or has particles.  This drug is colorless to a faint yellow. Do not use if the solution changes color.  Do not  move this drug from the pen to a syringe.  Each pen or vial is for 1 use only. Throw away any part of the used pen after the dose is given.  Throw away needles in a needle/sharp disposal box. Do not reuse needles or other items. When the box is full, follow all local rules for getting rid of it. Talk with a doctor or pharmacist if you have any questions.  Vials:   It is important to have the right syringe to measure your dose. If you do not have the right syringe or you are not sure, talk with your pharmacist.  If you are using this drug for diabetes:   If you are also using insulin, you may inject this drug and the insulin in the same area of the body but not right next to each other.  Do not mix this drug in the same syringe with insulin.  What do I do if I miss a dose?   If it is within 4 days after the missed dose, take the missed dose and go back to your normal day.  If it has been more than 4 days since the missed dose, skip the missed dose and go back to your normal day.  Do not take 2 doses at the same time or extra doses.  How do I store and/or throw out this drug?   Store in a refrigerator. Do not freeze.  Do not use if it has been frozen.  If needed, each pen or vial may be stored at room temperature for up to 21 days. If you store at room temperature, throw away any part not used after 21 days.  Protect from heat.  Store in the original container to protect from light.  Keep all drugs in a safe place. Keep all drugs out of the reach of children and pets.  Throw away unused or  drugs. Do not flush down a toilet or pour down a drain unless you are told to do so. Check with your pharmacist if you have questions about the best way to throw out drugs. There may be drug take-back programs in your area.  General drug facts   If your symptoms or health problems do not get better or if they become worse, call your doctor.  Do not share your drugs with others and do not take anyone else's drugs.  Some drugs  may have another patient information leaflet. If you have any questions about this drug, please talk with your doctor, nurse, pharmacist, or other health care provider.  This drug comes with an extra patient fact sheet called a Medication Guide. Read it with care. Read it again each time this drug is refilled. If you have any questions about this drug, please talk with the doctor, pharmacist, or other health care provider.  If you think there has been an overdose, call your poison control center or get medical care right away. Be ready to tell or show what was taken, how much, and when it happened.  Consumer Information Use and Disclaimer   This generalized information is a limited summary of diagnosis, treatment, and/or medication information. It is not meant to be comprehensive and should be used as a tool to help the user understand and/or assess potential diagnostic and treatment options. It does NOT include all information about conditions, treatments, medications, side effects, or risks that may apply to a specific patient. It is not intended to be medical advice or a substitute for the medical advice, diagnosis, or treatment of a health care provider based on the health care provider's examination and assessment of a patient's specific and unique circumstances. Patients must speak with a health care provider for complete information about their health, medical questions, and treatment options, including any risks or benefits regarding use of medications. This information does not endorse any treatments or medications as safe, effective, or approved for treating a specific patient. [employer] and its affiliates disclaim any warranty or liability relating to this information or the use thereof. The use of this information is governed by the Terms of Use, available at https://www.wolterskluwer.com/en/know/clinical-effectiveness-terms.  Last Reviewed Date   2024-04-17  Copyright   © 2024 [employer] and  its affiliates and/or licensors. All rights reserved.

## 2024-12-06 NOTE — ASSESSMENT & PLAN NOTE
Orders:    tirzepatide (Zepbound) 12.5 mg/0.5 mL auto-injector; Inject 0.5 mL (12.5 mg total) under the skin once a week    tirzepatide (Zepbound) 15 mg/0.5 mL auto-injector; Inject 0.5 mL (15 mg total) under the skin once a week Do not start before January 6, 2025.

## 2024-12-06 NOTE — PROGRESS NOTES
Name: Lina Johnson      : 1970      MRN: [id number]  Encounter Provider: PATRICIA Juan  Encounter Date: 2024   Encounter department: [facility]  :  Chief Complaint   Patient presents with    Follow-up    Weight Check     Sas/cma       Assessment & Plan  Class 1 obesity due to excess calories without serious comorbidity with body mass index (BMI) of 30.0 to 30.9 in adult      Orders:    tirzepatide (Zepbound) 12.5 mg/0.5 mL auto-injector; Inject 0.5 mL (12.5 mg total) under the skin once a week    tirzepatide (Zepbound) 15 mg/0.5 mL auto-injector; Inject 0.5 mL (15 mg total) under the skin once a week Do not start before 2025.    S/P gastric bypass    Orders:    tirzepatide (Zepbound) 12.5 mg/0.5 mL auto-injector; Inject 0.5 mL (12.5 mg total) under the skin once a week    tirzepatide (Zepbound) 15 mg/0.5 mL auto-injector; Inject 0.5 mL (15 mg total) under the skin once a week Do not start before 2025.    Mild depression      Orders:    tirzepatide (Zepbound) 12.5 mg/0.5 mL auto-injector; Inject 0.5 mL (12.5 mg total) under the skin once a week    tirzepatide (Zepbound) 15 mg/0.5 mL auto-injector; Inject 0.5 mL (15 mg total) under the skin once a week Do not start before 2025.    Systemic lupus erythematosus, unspecified SLE type, unspecified organ involvement status (HCC)    Orders:    tirzepatide (Zepbound) 12.5 mg/0.5 mL auto-injector; Inject 0.5 mL (12.5 mg total) under the skin once a week    tirzepatide (Zepbound) 15 mg/0.5 mL auto-injector; Inject 0.5 mL (15 mg total) under the skin once a week Do not start before 2025.    Anxiety    Orders:    tirzepatide (Zepbound) 12.5 mg/0.5 mL auto-injector; Inject 0.5 mL (12.5 mg total) under the skin once a week    tirzepatide (Zepbound) 15 mg/0.5 mL auto-injector; Inject 0.5 mL (15 mg total) under the skin once a week Do not start before 2025.    Insomnia, unspecified  type    Orders:    tirzepatide (Zepbound) 12.5 mg/0.5 mL auto-injector; Inject 0.5 mL (12.5 mg total) under the skin once a week    tirzepatide (Zepbound) 15 mg/0.5 mL auto-injector; Inject 0.5 mL (15 mg total) under the skin once a week Do not start before January 6, 2025.           History of Present Illness     Patient is here to follow up on zepbound.  Tolerating medication without any issues  Denies any throat discomfort and abdominal pain, nausea and vomiting         Review of Systems   Constitutional:  Negative for appetite change, chills, fever and unexpected weight change.   HENT: Negative.     Respiratory: Negative.     Cardiovascular: Negative.    Gastrointestinal: Negative.  Negative for abdominal pain, anal bleeding, blood in stool, constipation, diarrhea, nausea, rectal pain and vomiting.   Genitourinary: Negative.    Musculoskeletal: Negative.    Skin: Negative.    Neurological: Negative.           Objective   /78   Pulse 68   Temp 98.9 °F (37.2 °C)   Resp 16   Wt 83.5 kg (184 lb)   LMP 04/05/2018   BMI 28.82 kg/m²      Wt Readings from Last 3 Encounters:   12/06/24 83.5 kg (184 lb)   10/25/24 86.8 kg (191 lb 6.4 oz)   10/03/24 88.5 kg (195 lb)      Physical Exam  Neck:      Thyroid: No thyromegaly or thyroid tenderness.

## 2024-12-26 DIAGNOSIS — Z98.84 S/P GASTRIC BYPASS: ICD-10-CM

## 2025-01-05 ENCOUNTER — PATIENT MESSAGE (OUTPATIENT)
Dept: FAMILY MEDICINE CLINIC | Facility: CLINIC | Age: 55
End: 2025-01-05

## 2025-01-05 DIAGNOSIS — E66.811 CLASS 1 OBESITY DUE TO EXCESS CALORIES WITHOUT SERIOUS COMORBIDITY WITH BODY MASS INDEX (BMI) OF 30.0 TO 30.9 IN ADULT: ICD-10-CM

## 2025-01-05 DIAGNOSIS — Z98.84 S/P GASTRIC BYPASS: ICD-10-CM

## 2025-01-05 DIAGNOSIS — E66.09 CLASS 1 OBESITY DUE TO EXCESS CALORIES WITHOUT SERIOUS COMORBIDITY WITH BODY MASS INDEX (BMI) OF 30.0 TO 30.9 IN ADULT: ICD-10-CM

## 2025-01-05 DIAGNOSIS — F41.9 ANXIETY: ICD-10-CM

## 2025-01-05 DIAGNOSIS — G47.00 INSOMNIA, UNSPECIFIED TYPE: ICD-10-CM

## 2025-01-05 DIAGNOSIS — F32.A MILD DEPRESSION: ICD-10-CM

## 2025-01-05 DIAGNOSIS — M32.9 SYSTEMIC LUPUS ERYTHEMATOSUS, UNSPECIFIED SLE TYPE, UNSPECIFIED ORGAN INVOLVEMENT STATUS (HCC): ICD-10-CM

## 2025-01-06 RX ORDER — TIRZEPATIDE 15 MG/.5ML
15 INJECTION, SOLUTION SUBCUTANEOUS WEEKLY
Qty: 2 ML | Refills: 0 | Status: SHIPPED | OUTPATIENT
Start: 2025-01-06

## 2025-01-13 ENCOUNTER — RA CDI HCC (OUTPATIENT)
Dept: OTHER | Facility: HOSPITAL | Age: 55
End: 2025-01-13

## 2025-01-13 NOTE — PROGRESS NOTES
HCC coding opportunities       Chart reviewed, no opportunity found: CHART REVIEWED, NO OPPORTUNITY FOUND        Patients Insurance        Commercial Insurance: [employer]

## 2025-01-20 ENCOUNTER — OFFICE VISIT (OUTPATIENT)
Dept: FAMILY MEDICINE CLINIC | Facility: CLINIC | Age: 55
End: 2025-01-20
Payer: COMMERCIAL

## 2025-01-20 VITALS
TEMPERATURE: 97.9 F | HEART RATE: 68 BPM | BODY MASS INDEX: 26.93 KG/M2 | SYSTOLIC BLOOD PRESSURE: 120 MMHG | WEIGHT: 171.6 LBS | DIASTOLIC BLOOD PRESSURE: 80 MMHG | HEIGHT: 67 IN

## 2025-01-20 DIAGNOSIS — G47.00 INSOMNIA, UNSPECIFIED TYPE: ICD-10-CM

## 2025-01-20 DIAGNOSIS — Z98.84 S/P GASTRIC BYPASS: ICD-10-CM

## 2025-01-20 DIAGNOSIS — M32.9 SYSTEMIC LUPUS ERYTHEMATOSUS, UNSPECIFIED SLE TYPE, UNSPECIFIED ORGAN INVOLVEMENT STATUS (HCC): ICD-10-CM

## 2025-01-20 DIAGNOSIS — E66.811 CLASS 1 OBESITY DUE TO EXCESS CALORIES WITHOUT SERIOUS COMORBIDITY WITH BODY MASS INDEX (BMI) OF 30.0 TO 30.9 IN ADULT: ICD-10-CM

## 2025-01-20 DIAGNOSIS — F41.9 ANXIETY: ICD-10-CM

## 2025-01-20 DIAGNOSIS — E66.09 CLASS 1 OBESITY DUE TO EXCESS CALORIES WITHOUT SERIOUS COMORBIDITY WITH BODY MASS INDEX (BMI) OF 30.0 TO 30.9 IN ADULT: ICD-10-CM

## 2025-01-20 DIAGNOSIS — F32.A MILD DEPRESSION: ICD-10-CM

## 2025-01-20 DIAGNOSIS — D32.9 MENINGIOMA (HCC): ICD-10-CM

## 2025-01-20 PROCEDURE — 99213 OFFICE O/P EST LOW 20 MIN: CPT | Performed by: NURSE PRACTITIONER

## 2025-01-20 RX ORDER — ESTRADIOL 0.03 MG/D
1 FILM, EXTENDED RELEASE TRANSDERMAL 2 TIMES WEEKLY
COMMUNITY
Start: 2025-01-02

## 2025-01-20 RX ORDER — TIRZEPATIDE 15 MG/.5ML
15 INJECTION, SOLUTION SUBCUTANEOUS WEEKLY
Qty: 6 ML | Refills: 0 | Status: SHIPPED | OUTPATIENT
Start: 2025-01-20

## 2025-01-20 NOTE — PATIENT INSTRUCTIONS
Patient Education     Tirzepatide (tir ZEP a tide)   Brand Names: US Mounjaro; Zepbound   Brand Names: Rocky Mounjaro   Warning   This drug has been shown to cause thyroid cancer in some animals. It is not known if this happens in humans. If thyroid cancer happens, it may be deadly if not found and treated early. Call your doctor right away if you have a neck mass, trouble breathing, trouble swallowing, or have hoarseness that will not go away.  Do not use this drug if you have a health problem called Multiple Endocrine Neoplasia syndrome type 2 (MEN 2), or if you or a family member have had thyroid cancer.  Have your blood work checked and thyroid ultrasounds as you have been told by your doctor.  What is this drug used for?   It is used to lower blood sugar in people with type 2 diabetes.  It is used to help with weight loss in certain people.  What do I need to tell my doctor BEFORE I take this drug?   All products:   If you are allergic to this drug; any part of this drug; or any other drugs, foods, or substances. Tell your doctor about the allergy and what signs you had.  If you have ever had pancreatitis.  If you have stomach or bowel problems.  If you are using another drug that has the same drug in it.  If you are using another drug like this one. If you are not sure, ask your doctor or pharmacist.  If you are using this drug for diabetes:   If you have type 1 diabetes. Do not use this drug to treat type 1 diabetes.  Zepbound:   If you have or have ever had depression or thoughts of suicide.  This is not a list of all drugs or health problems that interact with this drug.  Tell your doctor and pharmacist about all of your drugs (prescription or OTC, natural products, vitamins) and health problems. You must check to make sure that it is safe for you to take this drug with all of your drugs and health problems. Do not start, stop, or change the dose of any drug without checking with your doctor.  What are  some things I need to know or do while I take this drug?   All products:   Tell all of your health care providers that you take this drug. This includes your doctors, nurses, pharmacists, and dentists.  Follow the diet and workout plan that your doctor told you about.  Talk with your doctor before you drink alcohol.  Birth control pills may not work as well to prevent pregnancy. If you take birth control pills, you may need to switch to another type of hormone-based birth control like a vaginal ring if your doctor tells you to. If another type of hormone-based birth control is not an option, use some other kind of birth control also, like a condom. Do this for 4 weeks after starting this drug and for 4 weeks each time the dose is raised.  This drug may prevent other drugs taken by mouth from getting into the body. If you take other drugs by mouth, you may need to take them at some other time than this drug. Talk with your doctor.  Do not share with another person even if the needle has been changed. Sharing your tray or pen may pass infections from one person to another. This includes infections you may not know you have.  If you cannot drink liquids by mouth or if you have upset stomach, throwing up, or diarrhea that does not go away, you need to avoid getting dehydrated. Contact your doctor to find out what to do. Dehydration may lead to low blood pressure or to new or worsening kidney problems.  A severe and sometimes deadly pancreas problem (pancreatitis) has happened with other drugs like this one.  If you are using this drug for diabetes:   It may be harder to control blood sugar during times of stress such as fever, infection, injury, or surgery. A change in physical activity, exercise, or diet may also affect blood sugar.  Check your blood sugar as you have been told by your doctor.  Do not drive if your blood sugar has been low. There is a greater chance of you having a crash.  Wear disease medical alert ID  (identification).  Tell your doctor if you are pregnant, plan on getting pregnant, or are breast-feeding. You will need to talk about the benefits and risks to you and the baby.  Zepbound:   If you have high blood sugar (diabetes), you will need to watch your blood sugar closely.  Weight loss during pregnancy may cause harm to the unborn baby. If you get pregnant while taking this drug or if you want to get pregnant, call your doctor right away.  Tell your doctor if you are breast-feeding. You will need to talk about any risks to your baby.  What are some side effects that I need to call my doctor about right away?   WARNING/CAUTION: Even though it may be rare, some people may have very bad and sometimes deadly side effects when taking a drug. Tell your doctor or get medical help right away if you have any of the following signs or symptoms that may be related to a very bad side effect:  All products:   Signs of an allergic reaction, like rash; hives; itching; red, swollen, blistered, or peeling skin with or without fever; wheezing; tightness in the chest or throat; trouble breathing, swallowing, or talking; unusual hoarseness; or swelling of the mouth, face, lips, tongue, or throat.  Signs of kidney problems like unable to pass urine, change in how much urine is passed, blood in the urine, or a big weight gain.  Signs of gallbladder problems like pain in the upper right belly area, right shoulder area, or between the shoulder blades; yellow skin or eyes; fever with chills; bloating; or very upset stomach or throwing up.  Signs of a pancreas problem (pancreatitis) like very bad stomach pain, very bad back pain, or very bad upset stomach or throwing up.  Dizziness or passing out.  A fast heartbeat.  Change in eyesight.  Low blood sugar can happen. The chance may be raised when this drug is used with other drugs for diabetes. Signs may be dizziness, headache, feeling sleepy or weak, shaking, fast heartbeat, confusion,  hunger, or sweating. Call your doctor right away if you have any of these signs. Follow what you have been told to do for low blood sugar. This may include taking glucose tablets, liquid glucose, or some fruit juices.  Zepbound:   New or worse behavior or mood changes like depression or thoughts of suicide.  What are some other side effects of this drug?   All drugs may cause side effects. However, many people have no side effects or only have minor side effects. Call your doctor or get medical help if any of these side effects or any other side effects bother you or do not go away:  All products:   Constipation, diarrhea, stomach pain, upset stomach, throwing up, or decreased appetite.  Heartburn.  Pain, itching, or other irritation where the injection was given.  Zepbound:   Feeling tired or weak.  Hair loss.  These are not all of the side effects that may occur. If you have questions about side effects, call your doctor. Call your doctor for medical advice about side effects.  You may report side effects to your national health agency.  You may report side effects to the FDA at 1-477.883.5373. You may also report side effects at https://www.fda.gov/medwatch.  How is this drug best taken?   Use this drug as ordered by your doctor. Read all information given to you. Follow all instructions closely.  All products:   It is given as a shot into the fatty part of the skin on the top of the thigh, belly area, or upper arm.  If you will be giving yourself the shot, your doctor or nurse will teach you how to give the shot.  Keep taking this drug as you have been told by your doctor or other health care provider, even if you feel well.  Take the same day each week.  Move site where you give the shot each time.  Take with or without food.  Wash your hands before and after use.  Do not use if the solution is leaking or has particles.  This drug is colorless to a faint yellow. Do not use if the solution changes color.  Do not  move this drug from the pen to a syringe.  Each pen or vial is for 1 use only. Throw away any part of the used pen after the dose is given.  Throw away needles in a needle/sharp disposal box. Do not reuse needles or other items. When the box is full, follow all local rules for getting rid of it. Talk with a doctor or pharmacist if you have any questions.  Vials:   It is important to have the right syringe to measure your dose. If you do not have the right syringe or you are not sure, talk with your pharmacist.  If you are using this drug for diabetes:   If you are also using insulin, you may inject this drug and the insulin in the same area of the body but not right next to each other.  Do not mix this drug in the same syringe with insulin.  What do I do if I miss a dose?   If it is within 4 days after the missed dose, take the missed dose and go back to your normal day.  If it has been more than 4 days since the missed dose, skip the missed dose and go back to your normal day.  Do not take 2 doses at the same time or extra doses.  How do I store and/or throw out this drug?   Store in a refrigerator. Do not freeze.  Do not use if it has been frozen.  If needed, each pen or vial may be stored at room temperature for up to 21 days. If you store at room temperature, throw away any part not used after 21 days.  Protect from heat.  Store in the original container to protect from light.  Keep all drugs in a safe place. Keep all drugs out of the reach of children and pets.  Throw away unused or  drugs. Do not flush down a toilet or pour down a drain unless you are told to do so. Check with your pharmacist if you have questions about the best way to throw out drugs. There may be drug take-back programs in your area.  General drug facts   If your symptoms or health problems do not get better or if they become worse, call your doctor.  Do not share your drugs with others and do not take anyone else's drugs.  Some drugs  may have another patient information leaflet. If you have any questions about this drug, please talk with your doctor, nurse, pharmacist, or other health care provider.  This drug comes with an extra patient fact sheet called a Medication Guide. Read it with care. Read it again each time this drug is refilled. If you have any questions about this drug, please talk with the doctor, pharmacist, or other health care provider.  If you think there has been an overdose, call your poison control center or get medical care right away. Be ready to tell or show what was taken, how much, and when it happened.  Consumer Information Use and Disclaimer   This generalized information is a limited summary of diagnosis, treatment, and/or medication information. It is not meant to be comprehensive and should be used as a tool to help the user understand and/or assess potential diagnostic and treatment options. It does NOT include all information about conditions, treatments, medications, side effects, or risks that may apply to a specific patient. It is not intended to be medical advice or a substitute for the medical advice, diagnosis, or treatment of a health care provider based on the health care provider's examination and assessment of a patient's specific and unique circumstances. Patients must speak with a health care provider for complete information about their health, medical questions, and treatment options, including any risks or benefits regarding use of medications. This information does not endorse any treatments or medications as safe, effective, or approved for treating a specific patient. [employer] and its affiliates disclaim any warranty or liability relating to this information or the use thereof. The use of this information is governed by the Terms of Use, available at https://www.wolterskluwer.com/en/know/clinical-effectiveness-terms.  Last Reviewed Date   2024-04-17  Copyright   © 2024 [employer] and  its affiliates and/or licensors. All rights reserved.

## 2025-01-20 NOTE — ASSESSMENT & PLAN NOTE
Orders:  •  tirzepatide (Zepbound) 15 mg/0.5 mL auto-injector; Inject 0.5 mL (15 mg total) under the skin once a week  •  Comprehensive metabolic panel; Future

## 2025-01-20 NOTE — PROGRESS NOTES
Name: Lina Johnson      : 1970      MRN: [id number]  Encounter Provider: PATRICIA Juan  Encounter Date: 2025   Encounter department: [facility]  :  Chief Complaint   Patient presents with   • Follow-up     [email or web address]        Assessment & Plan  Class 1 obesity due to excess calories without serious comorbidity with body mass index (BMI) of 30.0 to 30.9 in adult  Tolerating current dose of zepbound and will follow back in 3 months and will do blood work as ordered    Orders:  •  tirzepatide (Zepbound) 15 mg/0.5 mL auto-injector; Inject 0.5 mL (15 mg total) under the skin once a week  •  Comprehensive metabolic panel; Future  •  Vitamin B12; Future  •  Iron, TIBC and Ferritin Panel; Future    S/P gastric bypass    Orders:  •  tirzepatide (Zepbound) 15 mg/0.5 mL auto-injector; Inject 0.5 mL (15 mg total) under the skin once a week  •  Comprehensive metabolic panel; Future    Mild depression    Orders:  •  tirzepatide (Zepbound) 15 mg/0.5 mL auto-injector; Inject 0.5 mL (15 mg total) under the skin once a week  •  Comprehensive metabolic panel; Future    Systemic lupus erythematosus, unspecified SLE type, unspecified organ involvement status (HCC)    Orders:  •  tirzepatide (Zepbound) 15 mg/0.5 mL auto-injector; Inject 0.5 mL (15 mg total) under the skin once a week    Anxiety    Orders:  •  tirzepatide (Zepbound) 15 mg/0.5 mL auto-injector; Inject 0.5 mL (15 mg total) under the skin once a week    Insomnia, unspecified type    Orders:  •  tirzepatide (Zepbound) 15 mg/0.5 mL auto-injector; Inject 0.5 mL (15 mg total) under the skin once a week    Meningioma (HCC)  Managed by neurosurgeon              History of Present Illness   Patient is here to follow up on zepbound.  Denies any issues and concerns at this time  Felt some gassiness and started drinking cucumber water and it is better now  Discussed that long term  use of PPIs (prilosec) increases risk of kidney disease and decrease in  "bone density and advised to try OTC pepcid and will follow back for any concerns      Review of Systems   Constitutional:  Negative for appetite change, chills, fever and unexpected weight change.   HENT: Negative.     Respiratory: Negative.     Cardiovascular: Negative.    Gastrointestinal: Negative.  Negative for abdominal pain, anal bleeding, blood in stool, constipation, diarrhea, nausea, rectal pain and vomiting.        As noted in HPI       Genitourinary: Negative.    Skin: Negative.    Neurological: Negative.        Objective   /80   Pulse 68   Temp 97.9 °F (36.6 °C)   Ht 5' 7\" (1.702 m)   Wt 77.8 kg (171 lb 9.6 oz)   LMP 04/05/2018   BMI 26.88 kg/m²      Wt Readings from Last 3 Encounters:   01/20/25 77.8 kg (171 lb 9.6 oz)   12/06/24 83.5 kg (184 lb)   10/25/24 86.8 kg (191 lb 6.4 oz)      Physical Exam  Vitals reviewed.   Constitutional:       Appearance: She is well-developed.   Neck:      Thyroid: No thyromegaly or thyroid tenderness.   Cardiovascular:      Rate and Rhythm: Normal rate and regular rhythm.      Heart sounds: Normal heart sounds.   Pulmonary:      Effort: Pulmonary effort is normal.      Breath sounds: Normal breath sounds.   Abdominal:      General: Bowel sounds are normal.      Palpations: Abdomen is soft.      Tenderness: There is no abdominal tenderness.   Skin:     General: Skin is warm and dry.   Neurological:      Mental Status: She is alert and oriented to person, place, and time.   Psychiatric:         Behavior: Behavior normal.         Thought Content: Thought content normal.         Judgment: Judgment normal.         "

## 2025-01-20 NOTE — ASSESSMENT & PLAN NOTE
Orders:  •  tirzepatide (Zepbound) 15 mg/0.5 mL auto-injector; Inject 0.5 mL (15 mg total) under the skin once a week

## 2025-01-26 LAB
ALBUMIN SERPL-MCNC: 4.3 G/DL (ref 3.6–5.1)
ALBUMIN/GLOB SERPL: 2 (CALC) (ref 1–2.5)
ALP SERPL-CCNC: 84 U/L (ref 37–153)
ALT SERPL-CCNC: 14 U/L (ref 6–29)
AST SERPL-CCNC: 21 U/L (ref 10–35)
BILIRUB SERPL-MCNC: 1.1 MG/DL (ref 0.2–1.2)
BUN SERPL-MCNC: 13 MG/DL (ref 7–25)
BUN/CREAT SERPL: NORMAL (CALC) (ref 6–22)
CALCIUM SERPL-MCNC: 9.4 MG/DL (ref 8.6–10.4)
CHLORIDE SERPL-SCNC: 105 MMOL/L (ref 98–110)
CO2 SERPL-SCNC: 29 MMOL/L (ref 20–32)
CREAT SERPL-MCNC: 0.73 MG/DL (ref 0.5–1.03)
ENA SS-A AB SER IA-ACNC: NORMAL AI
ENA SS-B AB SER IA-ACNC: NORMAL AI
FERRITIN SERPL-MCNC: 14 NG/ML (ref 16–232)
GFR/BSA.PRED SERPLBLD CYS-BASED-ARV: 98 ML/MIN/1.73M2
GLOBULIN SER CALC-MCNC: 2.2 G/DL (CALC) (ref 1.9–3.7)
GLUCOSE SERPL-MCNC: 79 MG/DL (ref 65–99)
IRON SATN MFR SERPL: 19 % (CALC) (ref 16–45)
IRON SERPL-MCNC: 76 MCG/DL (ref 45–160)
POTASSIUM SERPL-SCNC: 4.3 MMOL/L (ref 3.5–5.3)
PROT SERPL-MCNC: 6.5 G/DL (ref 6.1–8.1)
SODIUM SERPL-SCNC: 143 MMOL/L (ref 135–146)
TIBC SERPL-MCNC: 396 MCG/DL (CALC) (ref 250–450)
VIT B12 SERPL-MCNC: 432 PG/ML (ref 200–1100)

## 2025-01-27 ENCOUNTER — RESULTS FOLLOW-UP (OUTPATIENT)
Dept: FAMILY MEDICINE CLINIC | Facility: CLINIC | Age: 55
End: 2025-01-27

## 2025-01-29 LAB
B2 GLYCOPROT1 IGA SER-ACNC: <2 U/ML
B2 GLYCOPROT1 IGG SER-ACNC: <2 U/ML
B2 GLYCOPROT1 IGM SER-ACNC: <2 U/ML
BUN SERPL-MCNC: 13 MG/DL (ref 7–25)
BUN/CREAT SERPL: NORMAL (CALC) (ref 6–22)
CALCIUM SERPL-MCNC: 9.4 MG/DL (ref 8.6–10.4)
CARDIOLIPIN IGA SER IA-ACNC: <2 APL-U/ML
CARDIOLIPIN IGG SER IA-ACNC: <2 GPL-U/ML
CARDIOLIPIN IGM SER IA-ACNC: <2 MPL-U/ML
CHLORIDE SERPL-SCNC: 106 MMOL/L (ref 98–110)
CO2 SERPL-SCNC: 30 MMOL/L (ref 20–32)
CREAT SERPL-MCNC: 0.79 MG/DL (ref 0.5–1.03)
GFR/BSA.PRED SERPLBLD CYS-BASED-ARV: 89 ML/MIN/1.73M2
GLUCOSE SERPL-MCNC: 79 MG/DL (ref 65–99)
LA PPP-IMP: NORMAL
POTASSIUM SERPL-SCNC: 4.2 MMOL/L (ref 3.5–5.3)
SCREEN APTT: 31 SEC
SCREEN DRVVT: 33 SEC
SODIUM SERPL-SCNC: 142 MMOL/L (ref 135–146)

## 2025-01-31 ENCOUNTER — OFFICE VISIT (OUTPATIENT)
Dept: FAMILY MEDICINE CLINIC | Facility: CLINIC | Age: 55
End: 2025-01-31
Payer: COMMERCIAL

## 2025-01-31 VITALS
BODY MASS INDEX: 26.68 KG/M2 | OXYGEN SATURATION: 95 % | RESPIRATION RATE: 18 BRPM | WEIGHT: 170 LBS | HEIGHT: 67 IN | TEMPERATURE: 97.2 F | DIASTOLIC BLOOD PRESSURE: 60 MMHG | SYSTOLIC BLOOD PRESSURE: 100 MMHG | HEART RATE: 88 BPM

## 2025-01-31 DIAGNOSIS — J06.9 ACUTE URI: Primary | ICD-10-CM

## 2025-01-31 LAB
S PYO AG THROAT QL: NEGATIVE
SARS-COV-2 AG UPPER RESP QL IA: NEGATIVE
SL AMB POCT RAPID FLU A: NORMAL
SL AMB POCT RAPID FLU B: NORMAL
VALID CONTROL: NORMAL

## 2025-01-31 PROCEDURE — 87811 SARS-COV-2 COVID19 W/OPTIC: CPT | Performed by: NURSE PRACTITIONER

## 2025-01-31 PROCEDURE — 87804 INFLUENZA ASSAY W/OPTIC: CPT | Performed by: NURSE PRACTITIONER

## 2025-01-31 PROCEDURE — 99213 OFFICE O/P EST LOW 20 MIN: CPT | Performed by: NURSE PRACTITIONER

## 2025-01-31 PROCEDURE — 87880 STREP A ASSAY W/OPTIC: CPT | Performed by: NURSE PRACTITIONER

## 2025-01-31 NOTE — PROGRESS NOTES
Name: Lina Johnson      : 1970      MRN: [id number]  Encounter Provider: PATRICIA Juan  Encounter Date: 2025   Encounter department: [facility]  :  Chief Complaint   Patient presents with   • Nasal Congestion   • Sore Throat   • Generalized Body Aches   • Headache     Started 2 days ago [id number]   • Fatigue       Assessment & Plan  Acute URI  Supportive care for viral illness discussed and advised. will follow up for any worsening and no improvement  Increase fluid intake, saline nasal rinses, and hot tea with honey and lemon.   Cool air humidification can be helpful as well.   May take Tylenol as needed for pain or fevers.    Mucinex D for sinus congestion or Coricidin HBP if you have high blood pressure or a heart condition.    Mucinex or Robitussin DM are effective for cough and chest congestion.      Recent Results (from the past 24 hours)   POCT Rapid Covid Ag    Collection Time: 25  9:16 AM   Result Value Ref Range    POCT SARS-CoV-2 Ag Negative Negative    VALID CONTROL Valid    POCT rapid flu A and B    Collection Time: 25  9:16 AM   Result Value Ref Range    RAPID FLU A neg     RAPID FLU B neg    POCT rapid ANTIGEN strepA    Collection Time: 25  9:27 AM   Result Value Ref Range     RAPID STREP A Negative Negative         Orders:  •  POCT Rapid Covid Ag  •  POCT rapid flu A and B  •  POCT rapid ANTIGEN strepA           History of Present Illness   Patient started with symptoms on 25 with fatigue, sore throat and headache  Denies fever, chills and sob      Sore Throat   Associated symptoms include congestion and headaches. Pertinent negatives include no abdominal pain, coughing, diarrhea, drooling, ear discharge, ear pain, shortness of breath, trouble swallowing or vomiting.   Generalized Body Aches  Associated symptoms include congestion, headaches, a sore throat and fatigue. Pertinent negatives include no ear discharge, ear pain, hearing loss, mouth  "sores, rhinorrhea, fever, coughing, shortness of breath, wheezing, abdominal pain, constipation, diarrhea, nausea or vomiting.   Headache  Fatigue  Associated symptoms include congestion, fatigue, headaches, myalgias and a sore throat. Pertinent negatives include no abdominal pain, chills, coughing, diaphoresis, fever, nausea or vomiting.     Review of Systems   Constitutional:  Positive for fatigue. Negative for chills, diaphoresis, fever and unexpected weight change.   HENT:  Positive for congestion and sore throat. Negative for dental problem, drooling, ear discharge, ear pain, facial swelling, hearing loss, mouth sores, nosebleeds, postnasal drip, rhinorrhea, sinus pressure, sinus pain, sneezing, tinnitus, trouble swallowing and voice change.    Respiratory:  Negative for cough, chest tightness, shortness of breath and wheezing.    Cardiovascular: Negative.    Gastrointestinal:  Negative for abdominal pain, constipation, diarrhea, nausea and vomiting.   Musculoskeletal:  Positive for myalgias.   Skin: Negative.    Neurological:  Positive for headaches. Negative for dizziness and light-headedness.       Objective   /60   Pulse 88   Temp (!) 97.2 °F (36.2 °C)   Resp 18   Ht 5' 7\" (1.702 m)   Wt 77.1 kg (170 lb)   LMP 04/05/2018   SpO2 95%   BMI 26.63 kg/m²      Physical Exam  Vitals reviewed.   Constitutional:       Appearance: Normal appearance. She is well-developed.   HENT:      Head: Normocephalic.      Right Ear: Tympanic membrane, ear canal and external ear normal.      Left Ear: Tympanic membrane, ear canal and external ear normal.      Nose: Mucosal edema present.      Right Sinus: No maxillary sinus tenderness or frontal sinus tenderness.      Left Sinus: No maxillary sinus tenderness or frontal sinus tenderness.      Mouth/Throat:      Mouth: No oral lesions.      Pharynx: No oropharyngeal exudate or posterior oropharyngeal erythema.   Cardiovascular:      Rate and Rhythm: Normal rate and " "regular rhythm.      Heart sounds: Normal heart sounds.   Pulmonary:      Effort: Pulmonary effort is normal.      Breath sounds: Normal breath sounds.   Musculoskeletal:      Cervical back: Neck supple.   Lymphadenopathy:      Cervical:      Right cervical: No superficial or posterior cervical adenopathy.     Left cervical: No superficial or posterior cervical adenopathy.   Skin:     General: Skin is warm and dry.   Neurological:      Mental Status: She is alert and oriented to person, place, and time.   Psychiatric:         Behavior: Behavior normal.         Thought Content: Thought content normal.         Judgment: Judgment normal.         Current Outpatient Medications   Medication Instructions   • ALPRAZolam (XANAX) 0.25 mg, Oral, Daily at bedtime PRN   • buPROPion (WELLBUTRIN) 75 mg, Oral, 2 times daily   • Calcium Ascorbate (VITAMIN C) 500 mg, Daily   • Cholecalciferol (VITAMIN D3) 1000 units CAPS Daily   • Doxepin HCl 6 MG TABS Oral for 30 Days   • estradiol (VIVELLE-DOT) 0.025 MG/24HR 1 patch, 2 times weekly   • hydroxychloroquine (PLAQUENIL) 200 mg, Oral, 2 times daily   • hydrOXYzine HCL (ATARAX) 25 mg, Oral, 2 times daily PRN   • IRON PO Take by mouth   • meloxicam (MOBIC) 7.5 mg, Oral, Daily, As needed for joint pain   • Multiple Vitamin (MULTI-VITAMIN DAILY) TABS 1 tablet, Daily   • omeprazole (PRILOSEC) 20 mg, Oral, Daily   • Ubrogepant (Ubrelvy) 100 MG tablet Take 1 tablet (100 mg) one time as needed for migraine. May repeat one additional tablet (100 mg) at least two hours after the first dose. Do not use more than two doses per day, or for more than eight days per month.   • Zepbound 15 mg, Subcutaneous, Weekly          Visit Vitals  /60   Pulse 88   Temp (!) 97.2 °F (36.2 °C)   Resp 18   Ht 5' 7\" (1.702 m)   Wt 77.1 kg (170 lb)   LMP 04/05/2018   SpO2 95%   BMI 26.63 kg/m²   OB Status Hysterectomy   Smoking Status Never   BSA 1.89 m²          Tobacco Use: Low Risk  (1/31/2025)    Patient " History    • Smoking Tobacco Use: Never    • Smokeless Tobacco Use: Never    • Passive Exposure: Past        Past Medical History:   Diagnosis Date   • Condyloma acuminata 2012   • Obesity     Last assessed 13- morbid   • SLE (systemic lupus erythematosus) (HCC)    • Vitamin D deficiency     Last assessed 14          Past Surgical History:   Procedure Laterality Date   • CERVICAL BIOPSY  W/ LOOP ELECTRODE EXCISION     • DILATION AND CURETTAGE OF UTERUS     • GYNECOLOGIC CRYOSURGERY      cervix   • HYSTERECTOMY     • INDUCED   1998   • STOMACH SURGERY      bypass   • TONSILLECTOMY            Future Appointments   Date Time Provider Department Center   2025  6:30 PM [place] [facility]   4/15/2025  8:15 AM PATRICIA Juan [facility]   7/10/2025  3:00 PM Rony Gabriel MD [facility]           Return if symptoms worsen or fail to improve.

## 2025-01-31 NOTE — PATIENT INSTRUCTIONS
"Patient Education     Upper respiratory infection in adults - Discharge instructions   The Basics   Written by the doctors and editors at [facility]   What are discharge instructions? -- Discharge instructions are information about how to take care of yourself after getting medical care for a health problem.  What is an upper respiratory infection? -- An upper respiratory infection (\"URI\") is an illness that can affect your nose, throat, ears, and sinuses. Almost all URIs are caused by a virus. The common cold is an example of a viral URI. Some URIs are caused by bacteria, but this is much less common.  URIs spread easily from person to person, most often through coughing or sneezing. A URI will almost always get better in a week or 2 without any treatment. Because most URIs are caused by viruses, antibiotics do not usually help.  If you do have a bacterial infection, your doctor might prescribe antibiotics.  How do I care for myself at home? -- Ask the doctor or nurse what you should do when you go home. Make sure that you understand exactly what you need to do to care for yourself. Ask questions if there is anything you do not understand.  You should also:   Wash your hands often (figure 1), and cough or sneeze into a tissue. If you do not have a tissue, cough or sneeze into your elbow instead of your hands.   Drink lots of fluids (water, juice, or broth) to stay hydrated, unless your doctor told you otherwise. This will help replace any fluids lost through runny nose or fever. Warm tea or soup can also help soothe a sore throat.   To help a stuffy nose and make it easier to breathe:   Use saline nose drops or spray.   Use a humidifier if the air in your home feels dry.   Follow the directions on the label carefully if you take over-the-counter cough or cold medicines. Do not take more than 1 medicine that contains acetaminophen. Also, if you have a heart problem or high blood pressure, check with your doctor before " you take any of these medicines.   Try to quit smoking if you smoke. Your doctor or nurse can help.  How can I prevent getting another URI? -- The best way to prevent a URI, or keep it from spreading to others, is to keep your hands clean. Wash your hands often with soap and water or alcohol gel rubs.  Some other ways to prevent the spread of infection include:   Always wash your hands with soap and water after you cough, sneeze, or blow your nose.   Clean surfaces and objects that you touch a lot. These include sinks, counters, tables, door handles, remotes, and phones. Use a bleach and water mixture. The germs that cause a URI can live on surfaces for at least 2 hours.   Do not share cups, food, towels, bed linens, or other personal items.   Stay away from other people when you are sick. When you do need to be around other people, consider wearing a face mask.  When should I call the doctor? -- Call for advice if:   You have a persistent fever of 100.4°F (38°C) or higher, chills, a very bad sore throat, or ear or sinus pain.   You get a new fever after several days of feeling the same or getting better.   You start having chest pain when you cough.   You have a cough that lasts more than 10 days.   You cough up blood.   You have any new or worsening symptoms, such as worsening cough or trouble breathing.  All topics are updated as new evidence becomes available and our peer review process is complete.  This topic retrieved from [employer] on: Mar 13, 2024.  Topic 414861 Version 1.0  Release: 32.2.4 - C32.71  © 2024 UpToDate, Inc. and/or its affiliates. All rights reserved.  figure 1: How to wash your hands     Wet your hands with clean water, and apply a small amount of soap. Lather and rub hands together for at least 20 seconds. Clean your wrists, palms, backs of your hands, between your fingers, tips of your fingers, thumbs, and under and around your nails. Rinse well, and dry your hands using a clean  elmer.  Graphic 854489 Version 7.0  Consumer Information Use and Disclaimer   Disclaimer: This generalized information is a limited summary of diagnosis, treatment, and/or medication information. It is not meant to be comprehensive and should be used as a tool to help the user understand and/or assess potential diagnostic and treatment options. It does NOT include all information about conditions, treatments, medications, side effects, or risks that may apply to a specific patient. It is not intended to be medical advice or a substitute for the medical advice, diagnosis, or treatment of a health care provider based on the health care provider's examination and assessment of a patient's specific and unique circumstances. Patients must speak with a health care provider for complete information about their health, medical questions, and treatment options, including any risks or benefits regarding use of medications. This information does not endorse any treatments or medications as safe, effective, or approved for treating a specific patient. UpToDate, Inc. and its affiliates disclaim any warranty or liability relating to this information or the use thereof.The use of this information is governed by the Terms of Use, available at https://www.[employer].com/en/know/clinical-effectiveness-terms. 2024© UpToDate, Inc. and its affiliates and/or licensors. All rights reserved.  Copyright   © 2024 UpToDate, Inc. and/or its affiliates. All rights reserved.

## 2025-02-12 DIAGNOSIS — M32.9 SYSTEMIC LUPUS ERYTHEMATOSUS, UNSPECIFIED SLE TYPE, UNSPECIFIED ORGAN INVOLVEMENT STATUS (HCC): ICD-10-CM

## 2025-02-12 NOTE — TELEPHONE ENCOUNTER
Requested Prescriptions     Pending Prescriptions Disp Refills    meloxicam (Mobic) 7.5 mg tablet 90 tablet 2     Sig: Take 1 tablet (7.5 mg total) by mouth daily As needed for joint pain

## 2025-02-12 NOTE — TELEPHONE ENCOUNTER
Refill must be reviewed and completed by the office or provider. The refill is unable to be approved or denied by the medication management team.      Please review preferred pharmacy with patient, last refill 10.23.2024 for 90+2 to [facility], patient have remaining refills.

## 2025-02-15 RX ORDER — MELOXICAM 7.5 MG/1
7.5 TABLET ORAL DAILY
Qty: 90 TABLET | Refills: 2 | Status: SHIPPED | OUTPATIENT
Start: 2025-02-15

## 2025-02-19 ENCOUNTER — HOSPITAL ENCOUNTER (OUTPATIENT)
Dept: RADIOLOGY | Facility: HOSPITAL | Age: 55
Discharge: HOME | End: 2025-02-19
Payer: COMMERCIAL

## 2025-02-19 DIAGNOSIS — D32.9 MENINGIOMA (HCC): ICD-10-CM

## 2025-02-19 PROCEDURE — 70553 MRI BRAIN STEM W/O & W/DYE: CPT

## 2025-02-19 PROCEDURE — A9585 GADOBUTROL INJECTION: HCPCS | Performed by: NURSE PRACTITIONER

## 2025-02-19 RX ORDER — GADOBUTROL 604.72 MG/ML
8 INJECTION INTRAVENOUS
Status: COMPLETED | OUTPATIENT
Start: 2025-02-19 | End: 2025-02-19

## 2025-02-19 RX ADMIN — GADOBUTROL 8 ML: 604.72 INJECTION INTRAVENOUS at 18:36

## 2025-03-11 ENCOUNTER — OFFICE VISIT (OUTPATIENT)
Dept: NEUROSURGERY | Facility: CLINIC | Age: 55
End: 2025-03-11
Payer: COMMERCIAL

## 2025-03-11 VITALS
RESPIRATION RATE: 16 BRPM | BODY MASS INDEX: 26.68 KG/M2 | HEIGHT: 67 IN | WEIGHT: 170 LBS | SYSTOLIC BLOOD PRESSURE: 100 MMHG | OXYGEN SATURATION: 98 % | HEART RATE: 79 BPM | DIASTOLIC BLOOD PRESSURE: 60 MMHG | TEMPERATURE: 98.3 F

## 2025-03-11 DIAGNOSIS — D32.9 MENINGIOMA (HCC): Primary | ICD-10-CM

## 2025-03-11 PROCEDURE — 99204 OFFICE O/P NEW MOD 45 MIN: CPT | Performed by: PHYSICIAN ASSISTANT

## 2025-03-11 NOTE — PROGRESS NOTES
Name: Lina Johnson      : 1970      MRN: [id number]  Encounter Provider: Pina Terrazas PA-C  Encounter Date: 3/11/2025   Encounter department: [facility] [place]  :  Assessment & Plan  Meningioma (HCC)  Patient presents for evaluation of 2cm left high parietal dural based mass, likely meningioma  Discovered incidentally during work up of HA and vision complaints in 2015; seen by Dr Tinajero at [facility] who recommended annual f/u  Patient has had periodic imaging over the past 10 years but no interval MRI; now meningioma with slight increase in size by 1-2mm  On exam, GCS 15 and neurologically intact    Imaging:  MRI brain w/wo, 25: 1.9 cm partially calcified meningioma along left paramedian parietal convexity, slowly increasing in size since 2015. Similar mild mass effect on adjacent left parietal lobe and contact along posterior aspect of superior sagittal sinus     Plan:  Imaging reviewed with patient in the room and compared to MRI in 2015. Over the past 10 years there has been interval growth of 1-2mm, but no FLAIR edema to suggest rapid growth or brain compression  NCCN guidelines reviewed with patient  Treatment options discussed briefly, including serial imaging / XRT / surgical resection  Given mild growth, recommend continuing annual MRI for now, for which patient is agreeable   Follow up in 1 year after repeat MRI brain w/wo    Orders:    MRI brain with and without contrast; Future        History of Present Illness         This is a 54-year-old female with a past medical history significant for lupus who presents today for evaluation of left high parietal meningioma. This was initially diagnosed in  during a work up for headaches and vision changes. She saw Dr Tinajero at [facility] neurosurgery who did not feel her symptoms were caused by the meningioma (lack of FLAIR edema) and recommended 1 year follow up MRI. She has been following with her neurologist for headaches and has had  "periodic stable imaging over the years. She scheduled an appointment with the neurologist for a refill of Ubrelvy who recommended a repeat MRI brain to monitor the meningioma. This was obtained last month and showed mild interval growth over 10 years of approximately 1-2mm. There is still no FLAIR edema suggesting rapid growth.     Currently she is complaining of hazy vision, arthralgias, unsteady gait but no falls, occasional word finding difficulty, and persistent headaches. None of these symptoms are new.          Review of Systems   HENT:  Negative for tinnitus.    Eyes:  Positive for visual disturbance (\"haze\").   Gastrointestinal: Negative.    Genitourinary:  Positive for frequency.   Musculoskeletal:  Positive for arthralgias, back pain, gait problem (sometimes unsteady, no recent falls, stumbles) and neck stiffness.   Neurological:  Positive for speech difficulty (uses incorrect words) and headaches.   Psychiatric/Behavioral:  Negative for confusion and decreased concentration.         \"Foggy\"     I have personally reviewed the MA's review of systems and made changes as necessary.    Past Medical History   Past Medical History:   Diagnosis Date    Anemia     Condyloma acuminata 2012    Headache(784.0)     Low back pain     Miscarriage     Obesity     Last assessed 13- morbid    SLE (systemic lupus erythematosus) ([facility])     Vitamin D deficiency     Last assessed 14     Past Surgical History:   Procedure Laterality Date    CERVICAL BIOPSY  W/ LOOP ELECTRODE EXCISION      DILATION AND CURETTAGE OF UTERUS      GYNECOLOGIC CRYOSURGERY      cervix    HYSTERECTOMY      INDUCED   1998    STOMACH SURGERY      bypass    TONSILLECTOMY       Family History   Problem Relation Age of Onset    Heart failure Mother         Congestive    Coronary artery disease Mother     Hypertension Mother     Dementia Mother     Migraines Mother     Neuropathy Mother     Stroke Mother     Hypertension " "Father     Hypertension Sister     Diabetes Brother     Hypertension Brother     Anemia Family     Arthritis Family     Cervical cancer Family     Cancer Family         Gastric    Ulcers Family     Uterine cancer Family      she reports that she has never smoked. She has been exposed to tobacco smoke. She has never used smokeless tobacco. She reports current alcohol use. She reports that she does not use drugs.  Current Outpatient Medications   Medication Instructions    ALPRAZolam (XANAX) 0.25 mg, Oral, Daily at bedtime PRN    buPROPion (WELLBUTRIN) 75 mg, Oral, 2 times daily    Calcium Ascorbate (VITAMIN C) 500 mg, Daily    Cholecalciferol (VITAMIN D3) 1000 units CAPS Daily    Doxepin HCl 6 MG TABS Oral for 30 Days    estradiol (VIVELLE-DOT) 0.025 MG/24HR 1 patch, 2 times weekly    hydroxychloroquine (PLAQUENIL) 200 mg, Oral, 2 times daily    hydrOXYzine HCL (ATARAX) 25 mg, Oral, 2 times daily PRN    IRON  mg, Daily    meloxicam (MOBIC) 7.5 mg, Oral, Daily, As needed for joint pain    Multiple Vitamin (MULTI-VITAMIN DAILY) TABS 1 tablet, Daily    omeprazole (PRILOSEC) 20 mg, Oral, Daily    Ubrogepant (Ubrelvy) 100 MG tablet Take 1 tablet (100 mg) one time as needed for migraine. May repeat one additional tablet (100 mg) at least two hours after the first dose. Do not use more than two doses per day, or for more than eight days per month.    Zepbound 15 mg, Subcutaneous, Weekly     Allergies   Allergen Reactions    Nsaids Other (See Comments)     Due to gastric bypass      Objective   /60 (BP Location: Right arm, Patient Position: Sitting, Cuff Size: Standard)   Pulse 79   Temp 98.3 °F (36.8 °C) (Temporal)   Resp 16   Ht 5' 7\" (1.702 m)   Wt 77.1 kg (170 lb)   LMP 04/05/2018   SpO2 98%   BMI 26.63 kg/m²     Physical Exam  Vitals and nursing note reviewed.   Constitutional:       Appearance: Normal appearance. She is well-developed and normal weight.   HENT:      Head: Normocephalic and " atraumatic.   Eyes:      General: Lids are normal.      Extraocular Movements: Extraocular movements intact.      Pupils: Pupils are equal, round, and reactive to light.   Cardiovascular:      Rate and Rhythm: Normal rate.   Pulmonary:      Effort: Pulmonary effort is normal. No respiratory distress.   Abdominal:      Palpations: Abdomen is soft.   Musculoskeletal:         General: Normal range of motion.      Cervical back: Normal range of motion.   Skin:     General: Skin is warm and dry.   Neurological:      Mental Status: She is alert and oriented to person, place, and time.      Motor: Motor strength is normal.     Deep Tendon Reflexes: Reflexes are normal and symmetric.   Psychiatric:         Mood and Affect: Mood normal.         Speech: Speech normal.         Behavior: Behavior normal.         Thought Content: Thought content normal.         Judgment: Judgment normal.       Neurological Exam  Mental Status  Alert. Oriented to person, place and time. Oriented to person, place, and time. Recent and remote memory are intact. Speech is normal. Language is fluent with no aphasia. Attention and concentration are normal. Fund of knowledge is appropriate for level of education.    Cranial Nerves  CN II: Visual acuity is normal. Visual fields full to confrontation.  CN III, IV, VI: Extraocular movements intact bilaterally. Normal lids and orbits bilaterally. Pupils equal round and reactive to light bilaterally.  CN V: Facial sensation is normal.  CN VII: Full and symmetric facial movement.  CN VIII: Hearing is normal.  CN IX, X: Palate elevates symmetrically. Normal gag reflex.  CN XI: Shoulder shrug strength is normal.  CN XII: Tongue midline without atrophy or fasciculations.    Motor  Normal muscle bulk throughout. Normal muscle tone. Strength is 5/5 throughout all four extremities.  No pronator drift.    Sensory  Sensation is intact to light touch, pinprick, vibration and proprioception in all four  extremities.    Reflexes  Deep tendon reflexes are 2+ and symmetric in all four extremities.    Coordination  Right: Finger-to-nose normal.Left: Finger-to-nose normal.    Gait  Normal casual, toe, heel and tandem gait.      Radiology Results Review: I personally reviewed the following image studies in PACS and associated radiology reports: CT head and MRI brain. My interpretation of the radiology images/reports is: as above.    Administrative Statements   I have spent a total time of 40 minutes in caring for this patient on the day of the visit/encounter including Diagnostic results, Prognosis, Risks and benefits of tx options, Instructions for management, Patient and family education, Importance of tx compliance, Risk factor reductions, Impressions, Counseling / Coordination of care, Documenting in the medical record, Reviewing/placing orders in the medical record (including tests, medications, and/or procedures), Obtaining or reviewing history  , and Communicating with other healthcare professionals .

## 2025-03-11 NOTE — ASSESSMENT & PLAN NOTE
Patient presents for evaluation of 2cm left high parietal dural based mass, likely meningioma  Discovered incidentally during work up of HA and vision complaints in 2015; seen by Dr Tinajero at [facility] who recommended annual f/u  Patient has had periodic imaging over the past 10 years but no interval MRI; now meningioma with slight increase in size by 1-2mm  On exam, GCS 15 and neurologically intact    Imaging:  MRI brain w/wo, 2/19/25: 1.9 cm partially calcified meningioma along left paramedian parietal convexity, slowly increasing in size since 2015. Similar mild mass effect on adjacent left parietal lobe and contact along posterior aspect of superior sagittal sinus     Plan:  Imaging reviewed with patient in the room and compared to MRI in 2015. Over the past 10 years there has been interval growth of 1-2mm, but no FLAIR edema to suggest rapid growth or brain compression  NCCN guidelines reviewed with patient  Treatment options discussed briefly, including serial imaging / XRT / surgical resection  Given mild growth, recommend continuing annual MRI for now, for which patient is agreeable   Follow up in 1 year after repeat MRI brain w/wo    Orders:    MRI brain with and without contrast; Future

## 2025-03-17 DIAGNOSIS — F32.A MILD DEPRESSION: ICD-10-CM

## 2025-03-17 RX ORDER — BUPROPION HYDROCHLORIDE 75 MG/1
75 TABLET ORAL 2 TIMES DAILY
Qty: 180 TABLET | Refills: 1 | Status: SHIPPED | OUTPATIENT
Start: 2025-03-17

## 2025-04-15 ENCOUNTER — TELEPHONE (OUTPATIENT)
Age: 55
End: 2025-04-15

## 2025-04-15 ENCOUNTER — OFFICE VISIT (OUTPATIENT)
Dept: FAMILY MEDICINE CLINIC | Facility: CLINIC | Age: 55
End: 2025-04-15
Payer: COMMERCIAL

## 2025-04-15 VITALS
WEIGHT: 165 LBS | TEMPERATURE: 97.8 F | OXYGEN SATURATION: 99 % | HEART RATE: 102 BPM | SYSTOLIC BLOOD PRESSURE: 116 MMHG | DIASTOLIC BLOOD PRESSURE: 72 MMHG | HEIGHT: 67 IN | RESPIRATION RATE: 16 BRPM | BODY MASS INDEX: 25.9 KG/M2

## 2025-04-15 DIAGNOSIS — Z13.29 SCREENING FOR THYROID DISORDER: Primary | ICD-10-CM

## 2025-04-15 DIAGNOSIS — M32.9 SYSTEMIC LUPUS ERYTHEMATOSUS, UNSPECIFIED SLE TYPE, UNSPECIFIED ORGAN INVOLVEMENT STATUS (HCC): ICD-10-CM

## 2025-04-15 DIAGNOSIS — Z98.84 S/P GASTRIC BYPASS: ICD-10-CM

## 2025-04-15 DIAGNOSIS — F41.9 ANXIETY: ICD-10-CM

## 2025-04-15 DIAGNOSIS — E66.811 CLASS 1 OBESITY DUE TO EXCESS CALORIES WITHOUT SERIOUS COMORBIDITY WITH BODY MASS INDEX (BMI) OF 30.0 TO 30.9 IN ADULT: ICD-10-CM

## 2025-04-15 DIAGNOSIS — G47.00 INSOMNIA, UNSPECIFIED TYPE: ICD-10-CM

## 2025-04-15 DIAGNOSIS — E66.09 CLASS 1 OBESITY DUE TO EXCESS CALORIES WITHOUT SERIOUS COMORBIDITY WITH BODY MASS INDEX (BMI) OF 30.0 TO 30.9 IN ADULT: ICD-10-CM

## 2025-04-15 DIAGNOSIS — F32.A MILD DEPRESSION: ICD-10-CM

## 2025-04-15 DIAGNOSIS — Z13.6 SCREENING FOR CARDIOVASCULAR CONDITION: ICD-10-CM

## 2025-04-15 PROCEDURE — 99214 OFFICE O/P EST MOD 30 MIN: CPT | Performed by: NURSE PRACTITIONER

## 2025-04-15 RX ORDER — TIRZEPATIDE 15 MG/.5ML
15 INJECTION, SOLUTION SUBCUTANEOUS WEEKLY
Qty: 6 ML | Refills: 0 | Status: SHIPPED | OUTPATIENT
Start: 2025-04-15

## 2025-04-15 RX ORDER — DOXEPIN 6 MG/1
6 TABLET, FILM COATED ORAL
Qty: 90 TABLET | Refills: 1 | Status: SHIPPED | OUTPATIENT
Start: 2025-04-15

## 2025-04-15 NOTE — TELEPHONE ENCOUNTER
PA for zepbound 15 mg/0.5 ml SUBMITTED to [facility]     via    []CMM-KEY:   [x]Surescripts-Case ID # [id number]   []Availity-Auth ID # NDC #   []Faxed to plan   []Other website   []Phone call Case ID #     []PA sent as URGENT    All office notes, labs and other pertaining documents and studies sent. Clinical questions answered. Awaiting determination from insurance company.     Turnaround time for your insurance to make a decision on your Prior Authorization can take 7-21 business days.

## 2025-04-15 NOTE — ASSESSMENT & PLAN NOTE
Managed by Rheumatologist  Orders:  •  tirzepatide (Zepbound) 15 mg/0.5 mL auto-injector; Inject 0.5 mL (15 mg total) under the skin once a week

## 2025-04-15 NOTE — PROGRESS NOTES
Name: Lina Johnson      : 1970      MRN: [id number]  Encounter Provider: PATRICIA Juan  Encounter Date: 4/15/2025   [facility]  :  Assessment & Plan  Class 1 obesity due to excess calories without serious comorbidity with body mass index (BMI) of 30.0 to 30.9 in adult  Will continue with zepbound at this time. Will continue with diet and excercise    Orders:  •  tirzepatide (Zepbound) 15 mg/0.5 mL auto-injector; Inject 0.5 mL (15 mg total) under the skin once a week  •  Comprehensive metabolic panel; Future    S/P gastric bypass    Orders:  •  tirzepatide (Zepbound) 15 mg/0.5 mL auto-injector; Inject 0.5 mL (15 mg total) under the skin once a week    Mild depression  Stable with current regimen    Orders:  •  tirzepatide (Zepbound) 15 mg/0.5 mL auto-injector; Inject 0.5 mL (15 mg total) under the skin once a week    Systemic lupus erythematosus, unspecified SLE type, unspecified organ involvement status (HCC)  Managed by Rheumatologist  Orders:  •  tirzepatide (Zepbound) 15 mg/0.5 mL auto-injector; Inject 0.5 mL (15 mg total) under the skin once a week    Anxiety    Orders:  •  tirzepatide (Zepbound) 15 mg/0.5 mL auto-injector; Inject 0.5 mL (15 mg total) under the skin once a week  •  Comprehensive metabolic panel; Future    Insomnia, unspecified type    Orders:  •  tirzepatide (Zepbound) 15 mg/0.5 mL auto-injector; Inject 0.5 mL (15 mg total) under the skin once a week  •  Doxepin HCl 6 MG TABS; Take 1 tablet (6 mg total) by mouth daily at bedtime  •  Comprehensive metabolic panel; Future    Screening for thyroid disorder    Orders:  •  TSH, 3rd generation with Free T4 reflex; Future    Screening for cardiovascular condition    Orders:  •  Lipid Panel with Direct LDL reflex; Future           History of Present Illness   Patient is here to follow up on weight loss medications and chronic conditions  Complaint with medications and tolerating it well.  Denies chest  "pain, sob, headache        Review of Systems   HENT: Negative.     Respiratory: Negative.     Cardiovascular: Negative.    Gastrointestinal: Negative.    Neurological:  Negative for dizziness.       Objective   /72 (BP Location: Left arm, Patient Position: Sitting, Cuff Size: Standard)   Pulse 102   Temp 97.8 °F (36.6 °C) (Temporal)   Resp 16   Ht 5' 7\" (1.702 m)   Wt 74.8 kg (165 lb)   LMP 04/05/2018   SpO2 99%   BMI 25.84 kg/m²      Wt Readings from Last 3 Encounters:   04/15/25 74.8 kg (165 lb)   03/11/25 77.1 kg (170 lb)   01/31/25 77.1 kg (170 lb)      Physical Exam  Vitals reviewed.   Constitutional:       Appearance: She is well-developed.   Neck:      Thyroid: No thyromegaly or thyroid tenderness.   Cardiovascular:      Rate and Rhythm: Normal rate and regular rhythm.      Heart sounds: Normal heart sounds.   Pulmonary:      Effort: Pulmonary effort is normal.      Breath sounds: Normal breath sounds.   Abdominal:      General: Bowel sounds are normal.      Palpations: Abdomen is soft.      Tenderness: There is no abdominal tenderness.   Skin:     General: Skin is warm and dry.   Neurological:      Mental Status: She is alert and oriented to person, place, and time.   Psychiatric:         Behavior: Behavior normal.         Thought Content: Thought content normal.         Judgment: Judgment normal.       Future Appointments   Date Time Provider Department Center   7/10/2025  8:30 AM PATRICIA Juan [facility]   7/10/2025  3:00 PM Rony Gabriel MD [facility]   3/10/2026  6:00 PM AN MRI 1 AN MRI [facility]   3/18/2026 11:15 AM SUYAPA Guerra [facility]         "

## 2025-04-15 NOTE — ASSESSMENT & PLAN NOTE
Orders:  •  tirzepatide (Zepbound) 15 mg/0.5 mL auto-injector; Inject 0.5 mL (15 mg total) under the skin once a week  •  Doxepin HCl 6 MG TABS; Take 1 tablet (6 mg total) by mouth daily at bedtime  •  Comprehensive metabolic panel; Future

## 2025-04-15 NOTE — PATIENT INSTRUCTIONS
Patient Education     Tirzepatide (tir ZEP a tide)   Brand Names: [place] Mounjaro; Zepbound   Brand Names: Rocky Mounjaro   Warning   This drug has been shown to cause thyroid cancer in some animals. It is not known if this happens in humans. If thyroid cancer happens, it may be deadly if not found and treated early. Call your doctor right away if you have a neck mass, trouble breathing, trouble swallowing, or have hoarseness that will not go away.  Do not use this drug if you have a health problem called Multiple Endocrine Neoplasia syndrome type 2 (MEN 2), or if you or a family member have had thyroid cancer.  Have your blood work checked and thyroid ultrasounds as you have been told by your doctor.  What is this drug used for?   It is used to lower blood sugar in people with type 2 diabetes.  It is used to help with weight loss in certain people.  What do I need to tell my doctor BEFORE I take this drug?   All products:   If you are allergic to this drug; any part of this drug; or any other drugs, foods, or substances. Tell your doctor about the allergy and what signs you had.  If you have ever had pancreatitis.  If you have stomach or bowel problems.  If you are using another drug that has the same drug in it.  If you are using another drug like this one. If you are not sure, ask your doctor or pharmacist.  If you are using this drug for diabetes:   If you have type 1 diabetes. Do not use this drug to treat type 1 diabetes.  Zepbound:   If you have or have ever had depression or thoughts of suicide.  This is not a list of all drugs or health problems that interact with this drug.  Tell your doctor and pharmacist about all of your drugs (prescription or OTC, natural products, vitamins) and health problems. You must check to make sure that it is safe for you to take this drug with all of your drugs and health problems. Do not start, stop, or change the dose of any drug without checking with your doctor.  What are  some things I need to know or do while I take this drug?   All products:   Tell all of your health care providers that you take this drug. This includes your doctors, nurses, pharmacists, and dentists.  Follow the diet and workout plan that your doctor told you about.  Talk with your doctor before you drink alcohol.  Birth control pills may not work as well to prevent pregnancy. If you take birth control pills, you may need to switch to another type of hormone-based birth control like a vaginal ring if your doctor tells you to. If another type of hormone-based birth control is not an option, use some other kind of birth control also, like a condom. Do this for 4 weeks after starting this drug and for 4 weeks each time the dose is raised.  This drug may prevent other drugs taken by mouth from getting into the body. If you take other drugs by mouth, you may need to take them at some other time than this drug. Talk with your doctor.  Do not share with another person even if the needle has been changed. Sharing your tray or pen may pass infections from one person to another. This includes infections you may not know you have.  If you cannot drink liquids by mouth or if you have upset stomach, throwing up, or diarrhea that does not go away, you need to avoid getting dehydrated. Contact your doctor to find out what to do. Dehydration may lead to low blood pressure or to new or worsening kidney problems.  A severe and sometimes deadly pancreas problem (pancreatitis) has happened with other drugs like this one.  If you are using this drug for diabetes:   It may be harder to control blood sugar during times of stress such as fever, infection, injury, or surgery. A change in physical activity, exercise, or diet may also affect blood sugar.  Check your blood sugar as you have been told by your doctor.  Do not drive if your blood sugar has been low. There is a greater chance of you having a crash.  Wear disease medical alert ID  (identification).  Tell your doctor if you are pregnant, plan on getting pregnant, or are breast-feeding. You will need to talk about the benefits and risks to you and the baby.  Zepbound:   If you have high blood sugar (diabetes), you will need to watch your blood sugar closely.  Weight loss during pregnancy may cause harm to the unborn baby. If you get pregnant while taking this drug or if you want to get pregnant, call your doctor right away.  Tell your doctor if you are breast-feeding. You will need to talk about any risks to your baby.  What are some side effects that I need to call my doctor about right away?   WARNING/CAUTION: Even though it may be rare, some people may have very bad and sometimes deadly side effects when taking a drug. Tell your doctor or get medical help right away if you have any of the following signs or symptoms that may be related to a very bad side effect:  All products:   Signs of an allergic reaction, like rash; hives; itching; red, swollen, blistered, or peeling skin with or without fever; wheezing; tightness in the chest or throat; trouble breathing, swallowing, or talking; unusual hoarseness; or swelling of the mouth, face, lips, tongue, or throat.  Signs of kidney problems like unable to pass urine, change in how much urine is passed, blood in the urine, or a big weight gain.  Signs of gallbladder problems like pain in the upper right belly area, right shoulder area, or between the shoulder blades; yellow skin or eyes; fever with chills; bloating; or very upset stomach or throwing up.  Signs of a pancreas problem (pancreatitis) like very bad stomach pain, very bad back pain, or very bad upset stomach or throwing up.  Dizziness or passing out.  A fast heartbeat.  Change in eyesight.  Low blood sugar can happen. The chance may be raised when this drug is used with other drugs for diabetes. Signs may be dizziness, headache, feeling sleepy or weak, shaking, fast heartbeat, confusion,  hunger, or sweating. Call your doctor right away if you have any of these signs. Follow what you have been told to do for low blood sugar. This may include taking glucose tablets, liquid glucose, or some fruit juices.  Zepbound:   New or worse behavior or mood changes like depression or thoughts of suicide.  What are some other side effects of this drug?   All drugs may cause side effects. However, many people have no side effects or only have minor side effects. Call your doctor or get medical help if any of these side effects or any other side effects bother you or do not go away:  All products:   Constipation, diarrhea, stomach pain, upset stomach, throwing up, or decreased appetite.  Heartburn.  Pain, itching, or other irritation where the injection was given.  Zepbound:   Feeling tired or weak.  Hair loss.  These are not all of the side effects that may occur. If you have questions about side effects, call your doctor. Call your doctor for medical advice about side effects.  You may report side effects to your national health agency.  You may report side effects to the FDA at 1-657.124.4327. You may also report side effects at https://www.fda.gov/medwatch.  How is this drug best taken?   Use this drug as ordered by your doctor. Read all information given to you. Follow all instructions closely.  All products:   It is given as a shot into the fatty part of the skin on the top of the thigh, belly area, or upper arm.  If you will be giving yourself the shot, your doctor or nurse will teach you how to give the shot.  Keep taking this drug as you have been told by your doctor or other health care provider, even if you feel well.  Take the same day each week.  Move site where you give the shot each time.  Take with or without food.  Wash your hands before and after use.  Do not use if the solution is leaking or has particles.  This drug is colorless to a faint yellow. Do not use if the solution changes color.  Do not  move this drug from the pen to a syringe.  Each pen or vial is for 1 use only. Throw away any part of the used pen after the dose is given.  Throw away needles in a needle/sharp disposal box. Do not reuse needles or other items. When the box is full, follow all local rules for getting rid of it. Talk with a doctor or pharmacist if you have any questions.  Vials:   It is important to have the right syringe to measure your dose. If you do not have the right syringe or you are not sure, talk with your pharmacist.  If you are using this drug for diabetes:   If you are also using insulin, you may inject this drug and the insulin in the same area of the body but not right next to each other.  Do not mix this drug in the same syringe with insulin.  What do I do if I miss a dose?   If it is within 4 days after the missed dose, take the missed dose and go back to your normal day.  If it has been more than 4 days since the missed dose, skip the missed dose and go back to your normal day.  Do not take 2 doses at the same time or extra doses.  How do I store and/or throw out this drug?   Store in a refrigerator. Do not freeze.  Do not use if it has been frozen.  If needed, each pen or vial may be stored at room temperature for up to 21 days. If you store at room temperature, throw away any part not used after 21 days.  Protect from heat.  Store in the original container to protect from light.  Keep all drugs in a safe place. Keep all drugs out of the reach of children and pets.  Throw away unused or  drugs. Do not flush down a toilet or pour down a drain unless you are told to do so. Check with your pharmacist if you have questions about the best way to throw out drugs. There may be drug take-back programs in your area.  General drug facts   If your symptoms or health problems do not get better or if they become worse, call your doctor.  Do not share your drugs with others and do not take anyone else's drugs.  Some drugs  may have another patient information leaflet. If you have any questions about this drug, please talk with your doctor, nurse, pharmacist, or other health care provider.  This drug comes with an extra patient fact sheet called a Medication Guide. Read it with care. Read it again each time this drug is refilled. If you have any questions about this drug, please talk with the doctor, pharmacist, or other health care provider.  If you think there has been an overdose, call your poison control center or get medical care right away. Be ready to tell or show what was taken, how much, and when it happened.  Consumer Information Use and Disclaimer   This generalized information is a limited summary of diagnosis, treatment, and/or medication information. It is not meant to be comprehensive and should be used as a tool to help the user understand and/or assess potential diagnostic and treatment options. It does NOT include all information about conditions, treatments, medications, side effects, or risks that may apply to a specific patient. It is not intended to be medical advice or a substitute for the medical advice, diagnosis, or treatment of a health care provider based on the health care provider's examination and assessment of a patient's specific and unique circumstances. Patients must speak with a health care provider for complete information about their health, medical questions, and treatment options, including any risks or benefits regarding use of medications. This information does not endorse any treatments or medications as safe, effective, or approved for treating a specific patient. [employer] and its affiliates disclaim any warranty or liability relating to this information or the use thereof. The use of this information is governed by the Terms of Use, available at https://www.wolterskluwer.com/en/know/clinical-effectiveness-terms.  Last Reviewed Date   2024-04-17  Copyright   © 2024 [employer] and  its affiliates and/or licensors. All rights reserved.

## 2025-04-16 NOTE — TELEPHONE ENCOUNTER
[place]  Zepbound 15 MG/0.5ML  APPROVED         Patient advised by          []MyChart Message  [x]Phone call   []LMOM  []L/M to call office as no active Communication consent on file  []Unable to leave detailed message as VM not approved on Communication consent       Pharmacy advised by    [x]Fax  []Phone call  []Secure Chat    Specialty Pharmacy    []

## 2025-04-18 ENCOUNTER — TELEPHONE (OUTPATIENT)
Dept: FAMILY MEDICINE CLINIC | Facility: CLINIC | Age: 55
End: 2025-04-18

## 2025-05-04 DIAGNOSIS — M32.9 SYSTEMIC LUPUS ERYTHEMATOSUS, UNSPECIFIED SLE TYPE, UNSPECIFIED ORGAN INVOLVEMENT STATUS (HCC): ICD-10-CM

## 2025-05-05 RX ORDER — HYDROXYCHLOROQUINE SULFATE 200 MG/1
200 TABLET, FILM COATED ORAL 2 TIMES DAILY
Qty: 180 TABLET | Refills: 0 | Status: SHIPPED | OUTPATIENT
Start: 2025-05-05 | End: 2025-08-03

## 2025-06-05 ENCOUNTER — TELEPHONE (OUTPATIENT)
Dept: FAMILY MEDICINE CLINIC | Facility: CLINIC | Age: 55
End: 2025-06-05

## 2025-06-05 NOTE — TELEPHONE ENCOUNTER
dropped off [facility] paperwork to be completed again. You have already completed the paperwork but he said it was not accepted because it was not on the original paperwork. The ones you filled were copies.    Placed the copies already completed by you AND the original forms that need to be completed in your folder.    Please scan and call patient once forms are completed.

## 2025-07-06 LAB
ALBUMIN SERPL-MCNC: 4.5 G/DL (ref 3.6–5.1)
ALBUMIN/GLOB SERPL: 2.1 (CALC) (ref 1–2.5)
ALP SERPL-CCNC: 91 U/L (ref 37–153)
ALT SERPL-CCNC: 18 U/L (ref 6–29)
AST SERPL-CCNC: 21 U/L (ref 10–35)
BILIRUB SERPL-MCNC: 1.4 MG/DL (ref 0.2–1.2)
BUN SERPL-MCNC: 13 MG/DL (ref 7–25)
BUN/CREAT SERPL: ABNORMAL (CALC) (ref 6–22)
CALCIUM SERPL-MCNC: 9.2 MG/DL (ref 8.6–10.4)
CHLORIDE SERPL-SCNC: 103 MMOL/L (ref 98–110)
CHOLEST SERPL-MCNC: 207 MG/DL
CHOLEST/HDLC SERPL: 1.9 (CALC)
CO2 SERPL-SCNC: 30 MMOL/L (ref 20–32)
CREAT SERPL-MCNC: 0.87 MG/DL (ref 0.5–1.03)
GFR/BSA.PRED SERPLBLD CYS-BASED-ARV: 80 ML/MIN/1.73M2
GLOBULIN SER CALC-MCNC: 2.1 G/DL (CALC) (ref 1.9–3.7)
GLUCOSE SERPL-MCNC: 79 MG/DL (ref 65–99)
HDLC SERPL-MCNC: 111 MG/DL
LDLC SERPL CALC-MCNC: 83 MG/DL (CALC)
NONHDLC SERPL-MCNC: 96 MG/DL (CALC)
POTASSIUM SERPL-SCNC: 4.8 MMOL/L (ref 3.5–5.3)
PROT SERPL-MCNC: 6.6 G/DL (ref 6.1–8.1)
SODIUM SERPL-SCNC: 140 MMOL/L (ref 135–146)
TRIGL SERPL-MCNC: 45 MG/DL
TSH SERPL-ACNC: 1.62 MIU/L

## 2025-07-10 ENCOUNTER — OFFICE VISIT (OUTPATIENT)
Dept: RHEUMATOLOGY | Facility: CLINIC | Age: 55
End: 2025-07-10
Payer: COMMERCIAL

## 2025-07-10 ENCOUNTER — OFFICE VISIT (OUTPATIENT)
Dept: FAMILY MEDICINE CLINIC | Facility: CLINIC | Age: 55
End: 2025-07-10
Payer: COMMERCIAL

## 2025-07-10 VITALS
SYSTOLIC BLOOD PRESSURE: 98 MMHG | DIASTOLIC BLOOD PRESSURE: 70 MMHG | WEIGHT: 160 LBS | HEART RATE: 76 BPM | RESPIRATION RATE: 16 BRPM | BODY MASS INDEX: 25.11 KG/M2 | HEIGHT: 67 IN | TEMPERATURE: 97 F

## 2025-07-10 DIAGNOSIS — M32.9 SYSTEMIC LUPUS ERYTHEMATOSUS, UNSPECIFIED SLE TYPE, UNSPECIFIED ORGAN INVOLVEMENT STATUS (HCC): ICD-10-CM

## 2025-07-10 DIAGNOSIS — F32.A MILD DEPRESSION: ICD-10-CM

## 2025-07-10 DIAGNOSIS — E66.09 CLASS 1 OBESITY DUE TO EXCESS CALORIES WITHOUT SERIOUS COMORBIDITY WITH BODY MASS INDEX (BMI) OF 30.0 TO 30.9 IN ADULT: ICD-10-CM

## 2025-07-10 DIAGNOSIS — Z13.6 SCREENING FOR CARDIOVASCULAR CONDITION: ICD-10-CM

## 2025-07-10 DIAGNOSIS — Z00.00 ANNUAL PHYSICAL EXAM: Primary | ICD-10-CM

## 2025-07-10 DIAGNOSIS — Z98.84 S/P GASTRIC BYPASS: ICD-10-CM

## 2025-07-10 DIAGNOSIS — F41.9 ANXIETY: ICD-10-CM

## 2025-07-10 DIAGNOSIS — G47.00 INSOMNIA, UNSPECIFIED TYPE: ICD-10-CM

## 2025-07-10 DIAGNOSIS — E66.811 CLASS 1 OBESITY DUE TO EXCESS CALORIES WITHOUT SERIOUS COMORBIDITY WITH BODY MASS INDEX (BMI) OF 30.0 TO 30.9 IN ADULT: ICD-10-CM

## 2025-07-10 DIAGNOSIS — E61.1 LOW IRON: ICD-10-CM

## 2025-07-10 PROCEDURE — 99396 PREV VISIT EST AGE 40-64: CPT | Performed by: NURSE PRACTITIONER

## 2025-07-10 RX ORDER — TIRZEPATIDE 12.5 MG/.5ML
12.5 INJECTION, SOLUTION SUBCUTANEOUS WEEKLY
Qty: 2 ML | Refills: 0 | Status: SHIPPED | OUTPATIENT
Start: 2025-07-10

## 2025-07-10 NOTE — PROGRESS NOTES
Adult Annual Physical  Name: Lina Johnson      : 1970      MRN: [id number]  Encounter Provider: PATRICIA Juan  Encounter Date: 7/10/2025   Encounter department: [facility]    :  Assessment & Plan  Annual physical exam         Class 1 obesity due to excess calories without serious comorbidity with body mass index (BMI) of 30.0 to 30.9 in adult  Will decrease zepbound to 12.5 mg and if still having any issues with dosage then follow with me and will hold next dose    Orders:  •  tirzepatide (Zepbound) 12.5 mg/0.5 mL auto-injector; Inject 0.5 mL (12.5 mg total) under the skin once a week  •  Comprehensive metabolic panel; Future    S/P gastric bypass    Orders:  •  tirzepatide (Zepbound) 12.5 mg/0.5 mL auto-injector; Inject 0.5 mL (12.5 mg total) under the skin once a week    Mild depression      Orders:  •  tirzepatide (Zepbound) 12.5 mg/0.5 mL auto-injector; Inject 0.5 mL (12.5 mg total) under the skin once a week    Systemic lupus erythematosus, unspecified SLE type, unspecified organ involvement status (HCC)    Orders:  •  tirzepatide (Zepbound) 12.5 mg/0.5 mL auto-injector; Inject 0.5 mL (12.5 mg total) under the skin once a week    Anxiety    Orders:  •  tirzepatide (Zepbound) 12.5 mg/0.5 mL auto-injector; Inject 0.5 mL (12.5 mg total) under the skin once a week    Insomnia, unspecified type    Orders:  •  tirzepatide (Zepbound) 12.5 mg/0.5 mL auto-injector; Inject 0.5 mL (12.5 mg total) under the skin once a week    Screening for cardiovascular condition    Orders:  •  Lipid Panel with Direct LDL reflex; Future    Low iron    Orders:  •  Iron, TIBC and Ferritin Panel; Future        Preventive Screenings:  - Diabetes Screening: screening up-to-date  - Cholesterol Screening: screening up-to-date   - Hepatitis C screening: screening up-to-date   - HIV screening: screening not indicated   - Cervical cancer screening: screening up-to-date   - Breast cancer screening: screening up-to-date    - Colon cancer screening: screening up-to-date   - Lung cancer screening: screening not indicated     Immunizations:  - Immunizations due: Prevnar 20    Counseling/Anticipatory Guidance:    - Diet: discussed recommendations for a healthy/well-balanced diet.   - Exercise: the importance of regular exercise/physical activity was discussed. Recommend exercise 3-5 times per week for at least 30 minutes.   - Injury prevention: discussed safety/seat belts, safety helmets, smoke detectors, carbon monoxide detectors, and smoking near bedding or upholstery.          History of Present Illness     Adult Annual Physical:  Patient presents for annual physical. Here for physical  Blood work reviewed and discussed. Bilirubin slightly elevated and will recheck with next blood work.  Total cholesterol went up and advised on diet changes  Stated that with last zepbound injection had stomach ache and body aches for 3 days and then resolved. Stated that would like to taper it down as comfortable with her weight.     Diet and Physical Activity:  - Diet/Nutrition: no special diet.  - Exercise: walking and 3-4 times a week on average.    Depression Screening:  - PHQ-2 Score: 1    General Health:  - Sleep: sleeps poorly and unrefreshing sleep.  - Hearing: normal hearing bilateral ears.  - Vision: most recent eye exam < 1 year ago and wears glasses.  - Dental: regular dental visits, brushes teeth twice daily and floss regularly.    /GYN Health:  - Follows with GYN: yes.   - Menopause: perimenopausal.   - History of STDs: no  - Contraception: hysterectomy.      Advanced Care Planning:  - Has an advanced directive?: no    - Has a durable medical POA?: no      Review of Systems   Constitutional: Negative.    HENT: Negative.     Eyes: Negative.    Respiratory: Negative.     Cardiovascular: Negative.    Gastrointestinal: Negative.         As noted in HPI       Endocrine: Negative.    Genitourinary: Negative.    Musculoskeletal: Negative.   "  Skin: Negative.    Allergic/Immunologic: Negative.    Neurological: Negative.    Hematological: Negative.    Psychiatric/Behavioral: Negative.           Objective   BP 98/70   Pulse 76   Temp (!) 97 °F (36.1 °C)   Resp 16   Ht 5' 7\" (1.702 m)   Wt 72.6 kg (160 lb)   LMP 04/05/2018   BMI 25.06 kg/m²     Physical Exam  Vitals and nursing note reviewed.   Constitutional:       General: She is not in acute distress.     Appearance: She is well-developed.   HENT:      Head: Normocephalic and atraumatic.      Salivary Glands: Right salivary gland is not diffusely enlarged or tender. Left salivary gland is not diffusely enlarged or tender.      Right Ear: Tympanic membrane, ear canal and external ear normal.      Left Ear: Tympanic membrane, ear canal and external ear normal.      Nose: Nose normal. No nasal tenderness or mucosal edema.      Right Sinus: No maxillary sinus tenderness or frontal sinus tenderness.      Left Sinus: No maxillary sinus tenderness or frontal sinus tenderness.      Mouth/Throat:      Lips: Pink.      Mouth: Mucous membranes are moist.      Dentition: Normal dentition. No dental tenderness.      Tongue: No lesions.      Pharynx: Oropharynx is clear. No pharyngeal swelling, oropharyngeal exudate, posterior oropharyngeal erythema or uvula swelling.     Eyes:      General: Lids are normal.         Right eye: No discharge or hordeolum.         Left eye: No discharge or hordeolum.      Conjunctiva/sclera: Conjunctivae normal.      Right eye: Right conjunctiva is not injected.      Left eye: Left conjunctiva is not injected.     Neck:      Thyroid: No thyromegaly or thyroid tenderness.     Cardiovascular:      Rate and Rhythm: Normal rate and regular rhythm.      Pulses: Normal pulses.      Heart sounds: Normal heart sounds, S1 normal and S2 normal. No murmur heard.  Pulmonary:      Effort: Pulmonary effort is normal. No respiratory distress.      Breath sounds: Normal breath sounds.   Chest: "      Chest wall: No mass, lacerations, deformity, swelling, tenderness, crepitus or edema. There is no dullness to percussion.   Abdominal:      General: Abdomen is flat. Bowel sounds are normal.      Palpations: Abdomen is soft.      Tenderness: There is no abdominal tenderness.      Hernia: There is no hernia in the umbilical area or ventral area.     Musculoskeletal:         General: No swelling or tenderness. Normal range of motion.      Cervical back: Neck supple.      Right lower leg: No edema.      Left lower leg: No edema.   Lymphadenopathy:      Cervical:      Right cervical: No superficial or posterior cervical adenopathy.     Left cervical: No superficial or posterior cervical adenopathy.      Upper Body:      Right upper body: No supraclavicular or axillary adenopathy.      Left upper body: No supraclavicular or axillary adenopathy.     Skin:     General: Skin is warm and dry.     Neurological:      General: No focal deficit present.      Mental Status: She is alert and oriented to person, place, and time.      GCS: GCS eye subscore is 4. GCS verbal subscore is 5. GCS motor subscore is 6.      Sensory: Sensation is intact.      Motor: Motor function is intact.      Coordination: Coordination is intact.      Gait: Gait is intact.      Deep Tendon Reflexes: Reflexes are normal and symmetric.     Psychiatric:         Attention and Perception: Attention normal.         Mood and Affect: Mood normal.         Speech: Speech normal.         Behavior: Behavior normal. Behavior is cooperative.         Thought Content: Thought content normal.         Cognition and Memory: Cognition normal.         Judgment: Judgment normal.

## 2025-07-10 NOTE — PATIENT INSTRUCTIONS
"Patient Education     Routine physical for adults   The Basics   Written by the doctors and editors at [facility]   What is a physical? -- A physical is a routine visit, or \"check-up,\" with your doctor. You might also hear it called a \"wellness visit\" or \"preventive visit.\"  During each visit, the doctor will:   Ask about your physical and mental health   Ask about your habits, behaviors, and lifestyle   Do an exam   Give you vaccines if needed   Talk to you about any medicines you take   Give advice about your health   Answer your questions  Getting regular check-ups is an important part of taking care of your health. It can help your doctor find and treat any problems you have. But it's also important for preventing health problems.  A routine physical is different from a \"sick visit.\" A sick visit is when you see a doctor because of a health concern or problem. Since physicals are scheduled ahead of time, you can think about what you want to ask the doctor.  How often should I get a physical? -- It depends on your age and health. In general, for people age 21 years and older:   If you are younger than 50 years, you might be able to get a physical every 3 years.   If you are 50 years or older, your doctor might recommend a physical every year.  If you have an ongoing health condition, like diabetes or high blood pressure, your doctor will probably want to see you more often.  What happens during a physical? -- In general, each visit will include:   Physical exam - The doctor or nurse will check your height, weight, heart rate, and blood pressure. They will also look at your eyes and ears. They will ask about how you are feeling and whether you have any symptoms that bother you.   Medicines - It's a good idea to bring a list of all the medicines you take to each doctor visit. Your doctor will talk to you about your medicines and answer any questions. Tell them if you are having any side effects that bother you. You " "should also tell them if you are having trouble paying for any of your medicines.   Habits and behaviors - This includes:   Your diet   Your exercise habits   Whether you smoke, drink alcohol, or use drugs   Whether you are sexually active   Whether you feel safe at home  Your doctor will talk to you about things you can do to improve your health and lower your risk of health problems. They will also offer help and support. For example, if you want to quit smoking, they can give you advice and might prescribe medicines. If you want to improve your diet or get more physical activity, they can help you with this, too.   Lab tests, if needed - The tests you get will depend on your age and situation. For example, your doctor might want to check your:   Cholesterol   Blood sugar   Iron level   Vaccines - The recommended vaccines will depend on your age, health, and what vaccines you already had. Vaccines are very important because they can prevent certain serious or deadly infections.   Discussion of screening - \"Screening\" means checking for diseases or other health problems before they cause symptoms. Your doctor can recommend screening based on your age, risk, and preferences. This might include tests to check for:   Cancer, such as breast, prostate, cervical, ovarian, colorectal, prostate, lung, or skin cancer   Sexually transmitted infections, such as chlamydia and gonorrhea   Mental health conditions like depression and anxiety  Your doctor will talk to you about the different types of screening tests. They can help you decide which screenings to have. They can also explain what the results might mean.   Answering questions - The physical is a good time to ask the doctor or nurse questions about your health. If needed, they can refer you to other doctors or specialists, too.  Adults older than 65 years often need other care, too. As you get older, your doctor will talk to you about:   How to prevent falling at " home   Hearing or vision tests   Memory testing   How to take your medicines safely   Making sure that you have the help and support you need at home  All topics are updated as new evidence becomes available and our peer review process is complete.  This topic retrieved from [employer] on: May 02, 2024.  Topic 433339 Version 1.0  Release: 32.4.3 - C32.122  © 2024 [employer] and/or its affiliates. All rights reserved.  Consumer Information Use and Disclaimer   Disclaimer: This generalized information is a limited summary of diagnosis, treatment, and/or medication information. It is not meant to be comprehensive and should be used as a tool to help the user understand and/or assess potential diagnostic and treatment options. It does NOT include all information about conditions, treatments, medications, side effects, or risks that may apply to a specific patient. It is not intended to be medical advice or a substitute for the medical advice, diagnosis, or treatment of a health care provider based on the health care provider's examination and assessment of a patient's specific and unique circumstances. Patients must speak with a health care provider for complete information about their health, medical questions, and treatment options, including any risks or benefits regarding use of medications. This information does not endorse any treatments or medications as safe, effective, or approved for treating a specific patient. [employer] and its affiliates disclaim any warranty or liability relating to this information or the use thereof.The use of this information is governed by the Terms of Use, available at https://www.woltersSwagsyuwer.com/en/know/clinical-effectiveness-terms. 2024© [employer]Date[employer]. and its affiliates and/or licensors. All rights reserved.  Copyright   © 2024 [employer] and/or its affiliates. All rights reserved.

## 2025-07-10 NOTE — ASSESSMENT & PLAN NOTE
Orders:  •  tirzepatide (Zepbound) 12.5 mg/0.5 mL auto-injector; Inject 0.5 mL (12.5 mg total) under the skin once a week

## 2025-08-09 ENCOUNTER — APPOINTMENT (EMERGENCY)
Dept: RADIOLOGY | Facility: HOSPITAL | Age: 55
End: 2025-08-09
Payer: COMMERCIAL

## 2025-08-09 ENCOUNTER — HOSPITAL ENCOUNTER (EMERGENCY)
Facility: HOSPITAL | Age: 55
Discharge: HOME | End: 2025-08-09
Attending: EMERGENCY MEDICINE | Admitting: EMERGENCY MEDICINE
Payer: COMMERCIAL

## 2025-08-11 ENCOUNTER — PATIENT MESSAGE (OUTPATIENT)
Dept: RHEUMATOLOGY | Facility: CLINIC | Age: 55
End: 2025-08-11

## 2025-08-11 ENCOUNTER — OFFICE VISIT (OUTPATIENT)
Dept: SURGERY | Facility: CLINIC | Age: 55
End: 2025-08-11
Payer: COMMERCIAL

## 2025-08-11 PROBLEM — M17.11 OSTEOARTHRITIS OF RIGHT KNEE: Status: RESOLVED | Noted: 2017-04-28 | Resolved: 2025-08-11

## 2025-08-11 PROBLEM — M71.21 BAKER'S CYST OF KNEE, RIGHT: Status: RESOLVED | Noted: 2017-04-28 | Resolved: 2025-08-11

## 2025-08-11 PROBLEM — N93.9 ABNORMAL UTERINE BLEEDING (AUB): Status: RESOLVED | Noted: 2018-03-19 | Resolved: 2025-08-11

## 2025-08-11 PROBLEM — E66.3 OVERWEIGHT: Status: RESOLVED | Noted: 2017-12-22 | Resolved: 2025-08-11

## 2025-08-11 PROBLEM — K80.10 CALCULUS OF GALLBLADDER WITH CHRONIC CHOLECYSTITIS: Status: ACTIVE | Noted: 2025-08-11

## 2025-08-11 PROBLEM — M25.561 KNEE PAIN, RIGHT: Status: RESOLVED | Noted: 2017-04-19 | Resolved: 2025-08-11

## 2025-08-11 PROBLEM — M25.561 RIGHT KNEE PAIN: Status: RESOLVED | Noted: 2017-04-20 | Resolved: 2025-08-11

## 2025-08-21 ENCOUNTER — HOSPITAL ENCOUNTER (OUTPATIENT)
Dept: RADIOLOGY | Facility: HOSPITAL | Age: 55
Discharge: HOME | End: 2025-08-21
Attending: STUDENT IN AN ORGANIZED HEALTH CARE EDUCATION/TRAINING PROGRAM
Payer: COMMERCIAL

## 2025-08-21 DIAGNOSIS — K80.10: ICD-10-CM

## 2025-08-21 PROCEDURE — 74181 MRI ABDOMEN W/O CONTRAST: CPT
